# Patient Record
Sex: FEMALE | Race: WHITE | NOT HISPANIC OR LATINO
[De-identification: names, ages, dates, MRNs, and addresses within clinical notes are randomized per-mention and may not be internally consistent; named-entity substitution may affect disease eponyms.]

---

## 2020-09-29 ENCOUNTER — APPOINTMENT (OUTPATIENT)
Dept: HEPATOLOGY | Facility: CLINIC | Age: 70
End: 2020-09-29
Payer: MEDICARE

## 2020-09-29 VITALS
BODY MASS INDEX: 31.5 KG/M2 | HEIGHT: 66 IN | WEIGHT: 196 LBS | SYSTOLIC BLOOD PRESSURE: 171 MMHG | TEMPERATURE: 97.7 F | HEART RATE: 99 BPM | RESPIRATION RATE: 15 BRPM | DIASTOLIC BLOOD PRESSURE: 80 MMHG

## 2020-09-29 PROCEDURE — 99203 OFFICE O/P NEW LOW 30 MIN: CPT

## 2020-09-29 NOTE — HISTORY OF PRESENT ILLNESS
[de-identified] : 69 y/o F w/ hx of HCV Ab+ here for return appt as a new patient. Patient saw from prior lab work that she was HCV Ab+ and was unclear what it meant. Never been treated before. \par Seen previously in our liver clinic back in 2016 and was found to have no viral load.\par Asymptomatic from a liver standpoint.

## 2020-09-29 NOTE — PHYSICAL EXAM
[Scleral Icterus] : No Scleral Icterus [Spider Angioma] : No spider angioma(s) were observed [Ascites Fluid Wave] : no ascites fluid wave [Ascites Tense] : ascites is not tense [Asterixis] : no asterixis observed [Jaundice] : No jaundice [General Appearance - Alert] : alert [General Appearance - In No Acute Distress] : in no acute distress [Sclera] : the sclera and conjunctiva were normal [Auscultation Breath Sounds / Voice Sounds] : lungs were clear to auscultation bilaterally [Heart Rate And Rhythm] : heart rate was normal and rhythm regular [Heart Sounds] : normal S1 and S2 [Heart Sounds Gallop] : no gallops [Murmurs] : no murmurs [Heart Sounds Pericardial Friction Rub] : no pericardial rub [Bowel Sounds] : normal bowel sounds [Abdomen Soft] : soft [Abdomen Tenderness] : non-tender [Abdomen Mass (___ Cm)] : no abdominal mass palpated [Skin Color & Pigmentation] : normal skin color and pigmentation [Skin Turgor] : normal skin turgor [] : no rash [Oriented To Time, Place, And Person] : oriented to person, place, and time [Impaired Insight] : insight and judgment were intact [Affect] : the affect was normal

## 2020-09-29 NOTE — ASSESSMENT
[FreeTextEntry1] : 69 y/o F w/ hx of HCV Ab+ here for return appt as a new patient.\par Undetectable viral load from 2016 suggestive of prior HCV exposure and cleared infection.  had HCV that is being followed by Dr. Capone.\par Counseled patient on what a HCV Ab+ but undetectable viral load means.\par Plan to recheck blood work including HCV viral load today for reassurance.\par \par RTC prn

## 2020-09-30 LAB
ALBUMIN SERPL ELPH-MCNC: 4.4 G/DL
ALP BLD-CCNC: 84 U/L
ALT SERPL-CCNC: 13 U/L
ANION GAP SERPL CALC-SCNC: 12 MMOL/L
AST SERPL-CCNC: 10 U/L
BASOPHILS # BLD AUTO: 0.01 K/UL
BASOPHILS NFR BLD AUTO: 0.2 %
BILIRUB SERPL-MCNC: 0.6 MG/DL
BUN SERPL-MCNC: 10 MG/DL
CALCIUM SERPL-MCNC: 9.5 MG/DL
CHLORIDE SERPL-SCNC: 102 MMOL/L
CO2 SERPL-SCNC: 28 MMOL/L
CREAT SERPL-MCNC: 0.66 MG/DL
EOSINOPHIL # BLD AUTO: 0.11 K/UL
EOSINOPHIL NFR BLD AUTO: 1.9 %
GLUCOSE SERPL-MCNC: 96 MG/DL
HCT VFR BLD CALC: 42.2 %
HCV AB SER QL: NONREACTIVE
HCV S/CO RATIO: 0.31 S/CO
HEPATITIS A IGG ANTIBODY: NONREACTIVE
HGB BLD-MCNC: 13.5 G/DL
IMM GRANULOCYTES NFR BLD AUTO: 0.3 %
INR PPP: 0.96 RATIO
LYMPHOCYTES # BLD AUTO: 1.79 K/UL
LYMPHOCYTES NFR BLD AUTO: 30.7 %
MAN DIFF?: NORMAL
MCHC RBC-ENTMCNC: 28.6 PG
MCHC RBC-ENTMCNC: 32 GM/DL
MCV RBC AUTO: 89.4 FL
MONOCYTES # BLD AUTO: 0.54 K/UL
MONOCYTES NFR BLD AUTO: 9.3 %
NEUTROPHILS # BLD AUTO: 3.36 K/UL
NEUTROPHILS NFR BLD AUTO: 57.6 %
PLATELET # BLD AUTO: 222 K/UL
POTASSIUM SERPL-SCNC: 4.4 MMOL/L
PROT SERPL-MCNC: 6.6 G/DL
PT BLD: 11.5 SEC
RBC # BLD: 4.72 M/UL
RBC # FLD: 13.5 %
SODIUM SERPL-SCNC: 142 MMOL/L
WBC # FLD AUTO: 5.83 K/UL

## 2020-10-27 ENCOUNTER — NON-APPOINTMENT (OUTPATIENT)
Age: 70
End: 2020-10-27

## 2021-05-25 ENCOUNTER — NON-APPOINTMENT (OUTPATIENT)
Age: 71
End: 2021-05-25

## 2021-06-08 ENCOUNTER — APPOINTMENT (OUTPATIENT)
Dept: HEPATOLOGY | Facility: CLINIC | Age: 71
End: 2021-06-08
Payer: MEDICARE

## 2021-06-08 PROCEDURE — 90471 IMMUNIZATION ADMIN: CPT

## 2021-06-08 PROCEDURE — 90636 HEP A/HEP B VACC ADULT IM: CPT | Mod: GY

## 2021-06-21 ENCOUNTER — NON-APPOINTMENT (OUTPATIENT)
Age: 71
End: 2021-06-21

## 2021-07-13 ENCOUNTER — APPOINTMENT (OUTPATIENT)
Dept: HEPATOLOGY | Facility: CLINIC | Age: 71
End: 2021-07-13
Payer: MEDICARE

## 2021-07-13 PROCEDURE — 90636 HEP A/HEP B VACC ADULT IM: CPT | Mod: GY

## 2021-07-13 PROCEDURE — 90471 IMMUNIZATION ADMIN: CPT

## 2021-12-08 ENCOUNTER — APPOINTMENT (OUTPATIENT)
Dept: HEPATOLOGY | Facility: CLINIC | Age: 71
End: 2021-12-08
Payer: MEDICARE

## 2021-12-08 VITALS
RESPIRATION RATE: 16 BRPM | BODY MASS INDEX: 29.09 KG/M2 | WEIGHT: 181 LBS | DIASTOLIC BLOOD PRESSURE: 69 MMHG | SYSTOLIC BLOOD PRESSURE: 123 MMHG | HEIGHT: 66 IN | OXYGEN SATURATION: 95 % | TEMPERATURE: 97.2 F | HEART RATE: 86 BPM

## 2021-12-08 DIAGNOSIS — R76.8 OTHER SPECIFIED ABNORMAL IMMUNOLOGICAL FINDINGS IN SERUM: ICD-10-CM

## 2021-12-08 PROCEDURE — G0010: CPT

## 2021-12-08 PROCEDURE — 90471 IMMUNIZATION ADMIN: CPT

## 2021-12-08 PROCEDURE — 90636 HEP A/HEP B VACC ADULT IM: CPT

## 2021-12-10 ENCOUNTER — NON-APPOINTMENT (OUTPATIENT)
Age: 71
End: 2021-12-10

## 2022-10-13 ENCOUNTER — APPOINTMENT (OUTPATIENT)
Dept: PODIATRY | Facility: CLINIC | Age: 72
End: 2022-10-13
Payer: MEDICARE

## 2022-10-13 DIAGNOSIS — Z82.49 FAMILY HISTORY OF ISCHEMIC HEART DISEASE AND OTHER DISEASES OF THE CIRCULATORY SYSTEM: ICD-10-CM

## 2022-10-13 DIAGNOSIS — S90.851A SUPERFICIAL FOREIGN BODY, RIGHT FOOT, INITIAL ENCOUNTER: ICD-10-CM

## 2022-10-13 DIAGNOSIS — Z80.0 FAMILY HISTORY OF MALIGNANT NEOPLASM OF DIGESTIVE ORGANS: ICD-10-CM

## 2022-10-13 PROCEDURE — 99203 OFFICE O/P NEW LOW 30 MIN: CPT | Mod: 25

## 2022-10-13 PROCEDURE — 28192 REMOVAL OF FOOT FOREIGN BODY: CPT | Mod: RT

## 2022-10-17 ENCOUNTER — APPOINTMENT (OUTPATIENT)
Dept: PODIATRY | Facility: CLINIC | Age: 72
End: 2022-10-17

## 2022-10-17 VITALS — WEIGHT: 181 LBS | BODY MASS INDEX: 29.09 KG/M2 | HEIGHT: 66 IN

## 2022-10-17 VITALS — HEIGHT: 66 IN | WEIGHT: 181 LBS | BODY MASS INDEX: 29.09 KG/M2

## 2022-10-17 DIAGNOSIS — M79.671 PAIN IN RIGHT FOOT: ICD-10-CM

## 2022-10-17 PROBLEM — S90.851A FOREIGN BODY IN RIGHT FOOT, INITIAL ENCOUNTER: Status: ACTIVE | Noted: 2022-10-14

## 2022-10-17 PROCEDURE — 99024 POSTOP FOLLOW-UP VISIT: CPT

## 2022-10-17 NOTE — ASSESSMENT
[FreeTextEntry1] : \par Impression: Deep foreign body.\par \par Treatment: I discussed doing this in the hospital. She absolutely refused. She wanted me to try to do it in the office so I took her to the operating room. I went through detailed consent. I discussed the possibility of continued retained foreign body, infection, and incisional problems, painful scar, wound problems and she wants to proceed. \par The foot was prepped with alcohol and Betadine. I used an ankle pneumatic tourniquet 250 mmHg after injecting Xylocaine 3cc's. Under strict sterile technique I used a #15 blade. I ellipsed the portal of entry and the patient had a bit of an inclusion cyst and a miniscule foreign body. It is difficult to tell where it was plastic. It could potentially be hair. I removed the surrounding cyst and soft tissue with it. I irrigated it with sterile saline under high force and I used 4-0 nylon to repair the wound. I put a sterile surgical dressing on. I put her into a surgical shoe. She is going to rest and keep the foot elevated. She will take Tylenol. I am going to see her back in 4 days. She will call the office with any issues whatsoever.

## 2022-10-17 NOTE — HISTORY OF PRESENT ILLNESS
[Sneakers] : grayson [FreeTextEntry1] : Patient presents today with this painful foreign body, possible a piece of hair that has been going on for a month. It is not getting any better and her pain level is 4/10. She presents today for evaluation and treatment.  Patient was referred by Dr Ten Ashraf. She presents today with 2 reports. One demonstrating suspicion for embedded hair shaft as well as an ultrasound that shows a retained foreign body deep to the skin surface, 2.4 x 1mm hyperechoic, noted foreign body with foreign body response.  She has recently gone for vascular evaluation who felt her circulation was adequate.\par

## 2022-10-17 NOTE — PHYSICAL EXAM
[2+] : left foot posterior tibialis 2+ [1+] : left foot dorsalis pedis 1+ [Sensation] : the sensory exam was normal to light touch and pinprick [No Focal Deficits] : no focal deficits [Deep Tendon Reflexes (DTR)] : deep tendon reflexes were 2+ and symmetric [Motor Exam] : the motor exam was normal [Delayed in the Right Toes] : capillary refills normal in right toes [Delayed in the Left Toes] : capillary refills normal in the left toes [FreeTextEntry3] : S [FreeTextEntry1] : There is a portal of entry at the right foot sub 4 and 5 area that is open and inflamed. There is no erythema, drainage or infection appreciated.

## 2022-10-18 PROBLEM — M79.671 RIGHT FOOT PAIN: Status: ACTIVE | Noted: 2022-10-14

## 2022-10-19 NOTE — PHYSICAL EXAM
[2+] : left foot posterior tibialis 2+ [1+] : left foot dorsalis pedis 1+ [Sensation] : the sensory exam was normal to light touch and pinprick [No Focal Deficits] : no focal deficits [Deep Tendon Reflexes (DTR)] : deep tendon reflexes were 2+ and symmetric [Motor Exam] : the motor exam was normal [Delayed in the Right Toes] : capillary refills normal in right toes [Delayed in the Left Toes] : capillary refills normal in the left toes [FreeTextEntry3] : CFT: Normal. [de-identified] : Doing well S/P removal of foreign body. [FreeTextEntry1] : Incision is well coapted. No swelling, erythema or pain.

## 2022-10-19 NOTE — ASSESSMENT
[FreeTextEntry1] : \par Treatment: I put a sterile surgical dressing on. Continue mild activity. Follow-up this week for suture removal.

## 2022-10-19 NOTE — HISTORY OF PRESENT ILLNESS
[Post-op Sandals] : post-op sandals [FreeTextEntry1] : Patient presents today S/P removal of foreign body. She has really at this point no complaints. She is doing well. \par \par

## 2022-10-21 ENCOUNTER — APPOINTMENT (OUTPATIENT)
Dept: PODIATRY | Facility: CLINIC | Age: 72
End: 2022-10-21
Payer: MEDICARE

## 2022-10-21 VITALS — BODY MASS INDEX: 29.09 KG/M2 | WEIGHT: 181 LBS | HEIGHT: 66 IN

## 2022-10-21 DIAGNOSIS — Z48.89 ENCOUNTER FOR OTHER SPECIFIED SURGICAL AFTERCARE: ICD-10-CM

## 2022-10-21 DIAGNOSIS — S90.851A SUPERFICIAL FOREIGN BODY, RIGHT FOOT, INITIAL ENCOUNTER: ICD-10-CM

## 2022-10-21 PROCEDURE — 99024 POSTOP FOLLOW-UP VISIT: CPT | Mod: 24

## 2022-10-21 PROCEDURE — 99212 OFFICE O/P EST SF 10 MIN: CPT | Mod: 24

## 2022-10-24 PROBLEM — S90.851A SUPERFICIAL FOREIGN BODY OF RIGHT FOOT, INITIAL ENCOUNTER: Status: ACTIVE | Noted: 2022-10-17

## 2022-10-24 PROBLEM — Z48.89 ENCOUNTER FOR SURGICAL FOLLOW-UP CARE: Status: ACTIVE | Noted: 2022-10-18

## 2022-10-25 NOTE — HISTORY OF PRESENT ILLNESS
[Sneakers] : grayson [FreeTextEntry1] : Patient presents today S/P removal of foreign body/foreign body reaction on the plantar aspect of the right foot. \par \par \par

## 2022-10-25 NOTE — ASSESSMENT
[FreeTextEntry1] : \par Treatment: Sutures were removed. Steri-strips applied. Pathology is still pending.

## 2022-10-25 NOTE — PHYSICAL EXAM
[2+] : left foot posterior tibialis 2+ [1+] : left foot dorsalis pedis 1+ [Sensation] : the sensory exam was normal to light touch and pinprick [No Focal Deficits] : no focal deficits [Deep Tendon Reflexes (DTR)] : deep tendon reflexes were 2+ and symmetric [Motor Exam] : the motor exam was normal [Delayed in the Right Toes] : capillary refills normal in right toes [Delayed in the Left Toes] : capillary refills normal in the left toes [FreeTextEntry3] : CFT: Normal. [de-identified] : No pain. No infection.  [FreeTextEntry1] : Very well coapted incision. No swelling. No bruising. No erythema. It is very nicely healed.

## 2022-11-04 LAB — CORE LAB BIOPSY: NORMAL

## 2023-01-19 ENCOUNTER — APPOINTMENT (OUTPATIENT)
Dept: PODIATRY | Facility: CLINIC | Age: 73
End: 2023-01-19

## 2023-03-10 ENCOUNTER — INPATIENT (INPATIENT)
Facility: HOSPITAL | Age: 73
LOS: 24 days | Discharge: HOME | End: 2023-04-04
Attending: PSYCHIATRY & NEUROLOGY | Admitting: PSYCHIATRY & NEUROLOGY
Payer: MEDICARE

## 2023-03-10 VITALS — TEMPERATURE: 97 F

## 2023-03-10 DIAGNOSIS — F32.9 MAJOR DEPRESSIVE DISORDER, SINGLE EPISODE, UNSPECIFIED: ICD-10-CM

## 2023-03-10 LAB
ALBUMIN SERPL ELPH-MCNC: 4.1 G/DL — SIGNIFICANT CHANGE UP (ref 3.3–5)
ALP SERPL-CCNC: 63 U/L — SIGNIFICANT CHANGE UP (ref 40–120)
ALT FLD-CCNC: 9 U/L — SIGNIFICANT CHANGE UP (ref 4–33)
ANION GAP SERPL CALC-SCNC: 12 MMOL/L — SIGNIFICANT CHANGE UP (ref 7–14)
APAP SERPL-MCNC: <10 UG/ML — LOW (ref 15–25)
APPEARANCE UR: CLEAR — SIGNIFICANT CHANGE UP
AST SERPL-CCNC: 12 U/L — SIGNIFICANT CHANGE UP (ref 4–32)
B PERT DNA SPEC QL NAA+PROBE: SIGNIFICANT CHANGE UP
B PERT+PARAPERT DNA PNL SPEC NAA+PROBE: SIGNIFICANT CHANGE UP
BACTERIA # UR AUTO: NEGATIVE — SIGNIFICANT CHANGE UP
BASOPHILS # BLD AUTO: 0.03 K/UL — SIGNIFICANT CHANGE UP (ref 0–0.2)
BASOPHILS NFR BLD AUTO: 0.4 % — SIGNIFICANT CHANGE UP (ref 0–2)
BILIRUB SERPL-MCNC: 0.7 MG/DL — SIGNIFICANT CHANGE UP (ref 0.2–1.2)
BILIRUB UR-MCNC: NEGATIVE — SIGNIFICANT CHANGE UP
BORDETELLA PARAPERTUSSIS (RAPRVP): SIGNIFICANT CHANGE UP
BUN SERPL-MCNC: 8 MG/DL — SIGNIFICANT CHANGE UP (ref 7–23)
C PNEUM DNA SPEC QL NAA+PROBE: SIGNIFICANT CHANGE UP
CALCIUM SERPL-MCNC: 9 MG/DL — SIGNIFICANT CHANGE UP (ref 8.4–10.5)
CHLORIDE SERPL-SCNC: 98 MMOL/L — SIGNIFICANT CHANGE UP (ref 98–107)
CO2 SERPL-SCNC: 24 MMOL/L — SIGNIFICANT CHANGE UP (ref 22–31)
COLOR SPEC: YELLOW — SIGNIFICANT CHANGE UP
CREAT SERPL-MCNC: 0.5 MG/DL — SIGNIFICANT CHANGE UP (ref 0.5–1.3)
DIFF PNL FLD: NEGATIVE — SIGNIFICANT CHANGE UP
EGFR: 99 ML/MIN/1.73M2 — SIGNIFICANT CHANGE UP
EOSINOPHIL # BLD AUTO: 0.06 K/UL — SIGNIFICANT CHANGE UP (ref 0–0.5)
EOSINOPHIL NFR BLD AUTO: 0.8 % — SIGNIFICANT CHANGE UP (ref 0–6)
ETHANOL SERPL-MCNC: <10 MG/DL — SIGNIFICANT CHANGE UP
FLUAV SUBTYP SPEC NAA+PROBE: SIGNIFICANT CHANGE UP
FLUBV RNA SPEC QL NAA+PROBE: SIGNIFICANT CHANGE UP
GLUCOSE SERPL-MCNC: 91 MG/DL — SIGNIFICANT CHANGE UP (ref 70–99)
GLUCOSE UR QL: NEGATIVE — SIGNIFICANT CHANGE UP
HADV DNA SPEC QL NAA+PROBE: SIGNIFICANT CHANGE UP
HCOV 229E RNA SPEC QL NAA+PROBE: SIGNIFICANT CHANGE UP
HCOV HKU1 RNA SPEC QL NAA+PROBE: SIGNIFICANT CHANGE UP
HCOV NL63 RNA SPEC QL NAA+PROBE: SIGNIFICANT CHANGE UP
HCOV OC43 RNA SPEC QL NAA+PROBE: SIGNIFICANT CHANGE UP
HCT VFR BLD CALC: 41.9 % — SIGNIFICANT CHANGE UP (ref 34.5–45)
HGB BLD-MCNC: 13.5 G/DL — SIGNIFICANT CHANGE UP (ref 11.5–15.5)
HMPV RNA SPEC QL NAA+PROBE: SIGNIFICANT CHANGE UP
HPIV1 RNA SPEC QL NAA+PROBE: SIGNIFICANT CHANGE UP
HPIV2 RNA SPEC QL NAA+PROBE: SIGNIFICANT CHANGE UP
HPIV3 RNA SPEC QL NAA+PROBE: SIGNIFICANT CHANGE UP
HPIV4 RNA SPEC QL NAA+PROBE: SIGNIFICANT CHANGE UP
IANC: 5.04 K/UL — SIGNIFICANT CHANGE UP (ref 1.8–7.4)
IMM GRANULOCYTES NFR BLD AUTO: 0.4 % — SIGNIFICANT CHANGE UP (ref 0–0.9)
KETONES UR-MCNC: ABNORMAL
LEUKOCYTE ESTERASE UR-ACNC: NEGATIVE — SIGNIFICANT CHANGE UP
LYMPHOCYTES # BLD AUTO: 1.96 K/UL — SIGNIFICANT CHANGE UP (ref 1–3.3)
LYMPHOCYTES # BLD AUTO: 25 % — SIGNIFICANT CHANGE UP (ref 13–44)
M PNEUMO DNA SPEC QL NAA+PROBE: SIGNIFICANT CHANGE UP
MCHC RBC-ENTMCNC: 27.1 PG — SIGNIFICANT CHANGE UP (ref 27–34)
MCHC RBC-ENTMCNC: 32.2 GM/DL — SIGNIFICANT CHANGE UP (ref 32–36)
MCV RBC AUTO: 84 FL — SIGNIFICANT CHANGE UP (ref 80–100)
MONOCYTES # BLD AUTO: 0.72 K/UL — SIGNIFICANT CHANGE UP (ref 0–0.9)
MONOCYTES NFR BLD AUTO: 9.2 % — SIGNIFICANT CHANGE UP (ref 2–14)
NEUTROPHILS # BLD AUTO: 5.04 K/UL — SIGNIFICANT CHANGE UP (ref 1.8–7.4)
NEUTROPHILS NFR BLD AUTO: 64.2 % — SIGNIFICANT CHANGE UP (ref 43–77)
NITRITE UR-MCNC: NEGATIVE — SIGNIFICANT CHANGE UP
NRBC # BLD: 0 /100 WBCS — SIGNIFICANT CHANGE UP (ref 0–0)
NRBC # FLD: 0 K/UL — SIGNIFICANT CHANGE UP (ref 0–0)
PCP SPEC-MCNC: SIGNIFICANT CHANGE UP
PH UR: 6 — SIGNIFICANT CHANGE UP (ref 5–8)
PLATELET # BLD AUTO: 228 K/UL — SIGNIFICANT CHANGE UP (ref 150–400)
POTASSIUM SERPL-MCNC: 3.9 MMOL/L — SIGNIFICANT CHANGE UP (ref 3.5–5.3)
POTASSIUM SERPL-SCNC: 3.9 MMOL/L — SIGNIFICANT CHANGE UP (ref 3.5–5.3)
PROT SERPL-MCNC: 6.8 G/DL — SIGNIFICANT CHANGE UP (ref 6–8.3)
PROT UR-MCNC: NEGATIVE — SIGNIFICANT CHANGE UP
RAPID RVP RESULT: SIGNIFICANT CHANGE UP
RBC # BLD: 4.99 M/UL — SIGNIFICANT CHANGE UP (ref 3.8–5.2)
RBC # FLD: 13.2 % — SIGNIFICANT CHANGE UP (ref 10.3–14.5)
RBC CASTS # UR COMP ASSIST: SIGNIFICANT CHANGE UP /HPF (ref 0–4)
RSV RNA SPEC QL NAA+PROBE: SIGNIFICANT CHANGE UP
RV+EV RNA SPEC QL NAA+PROBE: SIGNIFICANT CHANGE UP
SALICYLATES SERPL-MCNC: <0.3 MG/DL — LOW (ref 15–30)
SARS-COV-2 RNA SPEC QL NAA+PROBE: SIGNIFICANT CHANGE UP
SODIUM SERPL-SCNC: 134 MMOL/L — LOW (ref 135–145)
SP GR SPEC: 1.01 — SIGNIFICANT CHANGE UP (ref 1.01–1.05)
TSH SERPL-MCNC: 2.51 UIU/ML — SIGNIFICANT CHANGE UP (ref 0.27–4.2)
UROBILINOGEN FLD QL: SIGNIFICANT CHANGE UP
WBC # BLD: 7.84 K/UL — SIGNIFICANT CHANGE UP (ref 3.8–10.5)
WBC # FLD AUTO: 7.84 K/UL — SIGNIFICANT CHANGE UP (ref 3.8–10.5)
WBC UR QL: SIGNIFICANT CHANGE UP /HPF (ref 0–5)

## 2023-03-10 PROCEDURE — 70450 CT HEAD/BRAIN W/O DYE: CPT | Mod: 26,MA

## 2023-03-10 PROCEDURE — 90792 PSYCH DIAG EVAL W/MED SRVCS: CPT

## 2023-03-10 PROCEDURE — 99285 EMERGENCY DEPT VISIT HI MDM: CPT

## 2023-03-10 RX ORDER — LEVOTHYROXINE SODIUM 125 MCG
50 TABLET ORAL DAILY
Refills: 0 | Status: DISCONTINUED | OUTPATIENT
Start: 2023-03-10 | End: 2023-04-04

## 2023-03-10 RX ORDER — OFLOXACIN 0.3 %
1 DROPS OPHTHALMIC (EYE)
Refills: 0 | Status: DISCONTINUED | OUTPATIENT
Start: 2023-03-10 | End: 2023-03-11

## 2023-03-10 RX ORDER — SERTRALINE 25 MG/1
25 TABLET, FILM COATED ORAL DAILY
Refills: 0 | Status: DISCONTINUED | OUTPATIENT
Start: 2023-03-10 | End: 2023-03-11

## 2023-03-10 RX ORDER — LEVOTHYROXINE SODIUM 125 MCG
1 TABLET ORAL
Qty: 0 | Refills: 0 | DISCHARGE

## 2023-03-10 NOTE — ED BEHAVIORAL HEALTH ASSESSMENT NOTE - OTHER PAST PSYCHIATRIC HISTORY (INCLUDE DETAILS REGARDING ONSET, COURSE OF ILLNESS, INPATIENT/OUTPATIENT TREATMENT)
PPH MDD. Hx of 1 past inpatient admission 10-15 years ago (hospital unknown). Hx of 1 past suicide attempt 10-15 years ago via cutting- patient reports she required stitches. No current outpatient treater. [facility] MDD. Hx of 1 past inpatient admission 10-15 years ago (hospital unknown). Hx of 1 past suicide attempt 10-15 years ago via cutting- patient reports she required stitches. No current outpatient treater.    No history of bipolar disorder symptoms or psychosis

## 2023-03-10 NOTE — ED PROVIDER NOTE - CLINICAL SUMMARY MEDICAL DECISION MAKING FREE TEXT BOX
This is a 72-year-old female with a past medical history of depression that is been going on for the last several months.  She states that she for the past several months has stopped taking her medication which was Abilify. Depression-labs, mila consult, will admit for depression and SI. Will also get CT head.

## 2023-03-10 NOTE — ED PROVIDER NOTE - OBJECTIVE STATEMENT
This is a 72-year-old female with a past medical history of depression that is been going on for the last several months.  She states that she for the past several months has stopped taking her medication which was Abilify.  About a week ago she started taking it again because the depression got severe and the depression is worsening.  She has not seen her psychiatrist in several months and she is attempting to get a new psychiatrist this morning however was unable to obtain.  She states that last night she tried to cut her wrist on her left hands.  An attempt to commit suicide.  She has had suicidal attempt several years ago which required stitches.  Patient has been going through a lot with her family including diagnosis of cancer to her .  She denies having any chest pain shortness of breath trouble breathing fever chills or body aches. no drug or alcohol usage. This is a 72-year-old female with a past medical history of TSH, depression that is been going on for the last several months.  She states that she for the past several months has stopped taking her medication which was Abilify.  About a week ago she started taking it again because the depression got severe and the depression is worsening.  She has not seen her psychiatrist in several months and she is attempting to get a new psychiatrist this morning however was unable to obtain.  She states that last night she tried to cut her wrist on her left hands.  An attempt to commit suicide.  She has had suicidal attempt several years ago which required stitches.  Patient has been going through a lot with her family including diagnosis of cancer to her .  She denies having any chest pain shortness of breath trouble breathing fever chills or body aches. no drug or alcohol usage.

## 2023-03-10 NOTE — ED BEHAVIORAL HEALTH ASSESSMENT NOTE - NSBHATTESTCOMMENTATTENDFT_PSY_A_CORE
Patient is a 72 year old  retired  female currently residing in a private residence with  (daughter/son in law and 3 grandchildren live upstairs in a mother/daughter home). [facility] MDD. Hx of 1 past inpatient admission 10-15 years ago (hospital unknown). Hx of 1 past suicide attempt 10-15 years ago via cutting- patient reports she required stitches. No history of aggression/violence, legal issues or substance use issues. PMH- Hypothyroidism. BIB  for depression.    Patient presents to the ED in the context of depression. Patient endorses stopping psychiatric medication and over the last 2 weeks has had onset of depressive symptoms. She has multiple psychosocial stressors- various family members are ill. Patient had been having passive suicidal ideation but yesterday engaged in SIB- cutting self with scissors with ambivalent suicidal intent. She continues to endorse depression, anhedonia and  is not at baseline. She is unpredictable and impulsive and is requesting and agreeing to voluntary inpatient admission.

## 2023-03-10 NOTE — ED BEHAVIORAL HEALTH ASSESSMENT NOTE - DIFFERENTIAL
MDD   Dementia vs. Pseudo dementia symptoms related to MDD  Delusional Disorder? - chronic thoughts  is cheating on her MDD   Cognitive impairment vs. Pseudo dementia symptoms related to MDD  Delusional Disorder? - chronic thoughts  is cheating on her

## 2023-03-10 NOTE — ED BEHAVIORAL HEALTH ASSESSMENT NOTE - SUMMARY
Patient is a 72 year old  retired  female currently residing in a private residence with  (daughter/son in law and 3 grandchildren live upstairs in a mother/daughter home). PPH MDD. Hx of 1 past inpatient admission 10-15 years ago (hospital unknown). Hx of 1 past suicide attempt 10-15 years ago via cutting- patient reports she required stitches. No history of aggression/violence, legal issues or substance use issues. PMH- Hypothyroidism. BIB  for depression.    Patient presents to the ED in the context of depression. Patient endorses stopping psychiatric medication and over the last 2 weeks has had onset of depressive symptoms. She has multiple psychosocial stressors- various family members are ill. Patient had been having passive suicidal ideation but yesterday engaged in SIB- cutting self with scissors with ambivalent intent. She continues to endorse depression, anhedonia and  is not at baseline. She is unpredictable and impulsive and is requesting and agreeing to voluntary inpatient admission. Patient is a 72 year old  retired  female currently residing in a private residence with  (daughter/son in law and 3 grandchildren live upstairs in a mother/daughter home). PPH MDD. Hx of 1 past inpatient admission 10-15 years ago (hospital unknown). Hx of 1 past suicide attempt 10-15 years ago via cutting- patient reports she required stitches. No history of aggression/violence, legal issues or substance use issues. PMH- Hypothyroidism. BIB  for depression.    Patient presents to the ED in the context of depression. Patient endorses stopping psychiatric medication and over the last 2 weeks has had onset of depressive symptoms. She has multiple psychosocial stressors- various family members are ill. Patient had been having passive suicidal ideation but yesterday engaged in SIB- cutting self with scissors with ambivalent suicidal intent. She continues to endorse depression, anhedonia and  is not at baseline. She is unpredictable and impulsive and is requesting and agreeing to voluntary inpatient admission.
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
## 2023-03-10 NOTE — ED BEHAVIORAL HEALTH ASSESSMENT NOTE - CURRENT MEDICATION
Abilify 10mg , Synthroid Abilify 10mg , Synthroid 50mcg daily     Ventolin inhaler PRN Abilify 10mg- taking x 2 days , Synthroid 50mcg daily     Ventolin inhaler PRN

## 2023-03-10 NOTE — ED BEHAVIORAL HEALTH NOTE - BEHAVIORAL HEALTH NOTE
[employer] contacted [employer] ([phone]) to obtain authorization for inpatient psychiatric admission. Spoke with Carl BARR who provided authorization #[id number] which is valid 3/10/23-3/13/23 with review due 3/13/23. [employer] contacted [employer] ([phone]) to obtain authorization for inpatient psychiatric admission. Spoke with Carl BARR who provided authorization #[id number] which is valid 3/10/23-3/13/23 with review due 3/13/23. UR contact at [facility]. 30.3

## 2023-03-10 NOTE — ED BEHAVIORAL HEALTH ASSESSMENT NOTE - RISK ASSESSMENT
modifiable risk factors- depression, suicidal behavior, unpredictable bx, anhedonia, multiple psychosocial stressors    unmodifiable risk factors- history of inpatient admission, history of suicide attempt, r/o trauma?    protective factors- no substance use, no nury/hypomania, no psychosis

## 2023-03-10 NOTE — ED BEHAVIORAL HEALTH ASSESSMENT NOTE - MEDICAL ISSUES AND PLAN (INCLUDE STANDING AND PRN MEDICATION)
Ofloxacin eye drops QID x 7 days, Synthroid 50mcg daily Ofloxacin eye drops QID x 7 days (pink eye), Synthroid 50mcg daily

## 2023-03-10 NOTE — ED ADULT NURSE REASSESSMENT NOTE - NS ED NURSE REASSESS COMMENT FT1
Pt received at 8:30pm shift change. Pt presently calm and cooperative, watching TV. Pt awaiting CT Results and then xfer to inpatient psychiatric bed on LOW 5.

## 2023-03-10 NOTE — ED ADULT TRIAGE NOTE - CHIEF COMPLAINT QUOTE
Pt c/o worsening depression. Reports she discontinued her medication for about 3 1/2 months and was restarted on med about one week ago. Denies SI/HI, auditory/visual hallucinations. denies illicit drug/alcohol use. Hx: depression

## 2023-03-10 NOTE — ED BEHAVIORAL HEALTH ASSESSMENT NOTE - DESCRIPTION
72 year old female, retired , lives with  hypothyroidism During course of ED visit patient was calm and cooperative. Patient was not aggressive or violent and did not require PRN medications.    ICU Vital Signs Last 24 Hrs  T(C): 36.6 (10 Mar 2023 14:35), Max: 36.6 (10 Mar 2023 14:35)  T(F): 97.9 (10 Mar 2023 14:35), Max: 97.9 (10 Mar 2023 14:35)  HR: 92 (10 Mar 2023 14:35) (92 - 92)  BP: 147/72 (10 Mar 2023 14:35) (147/72 - 147/72)  BP(mean): --  ABP: --  ABP(mean): --  RR: 16 (10 Mar 2023 14:35) (16 - 16)  SpO2: 99% (10 Mar 2023 14:35) (99% - 99%)    O2 Parameters below as of 10 Mar 2023 14:35  Patient On (Oxygen Delivery Method): room air

## 2023-03-10 NOTE — ED BEHAVIORAL HEALTH ASSESSMENT NOTE - PSYCHIATRIC ISSUES AND PLAN (INCLUDE STANDING AND PRN MEDICATION)
Unclear why patient prescribed Monotherapy- Abilify 10mg; Would recommend starting SSRI ; Unclear why patient prescribed Monotherapy- Abilify 10mg; Would recommend starting SSRI Lexapro 10mg; PRN Ativan 0.5mg PRN, Ativan 1mg IM PRN Unclear why patient prescribed Monotherapy- Abilify 10mg; Would recommend starting SSRI Zoloft 25mg; PRN Ativan 0.25mg PRN, Ativan 1mg IM PRN Unclear why patient prescribed Monotherapy- Abilify 10mg- will stop only taking x 2 days; Would recommend starting SSRI Zoloft 25mg; PRN Ativan 0.25mg PRN, Ativan 1mg IM PRN

## 2023-03-10 NOTE — ED BEHAVIORAL HEALTH NOTE - BEHAVIORAL HEALTH NOTE
COVID Exposure Screen- Patient    1.	*Have you had a COVID-19 test in the last 90 days?  (  ) Yes   (x  ) No   (  ) Unknown- Reason: _____  IF YES PROCEED TO QUESTION #2. IF NO OR UNKNOWN, PLEASE SKIP TO QUESTION #3.  2.	Date of test(s) and result(s): ________    3.	*Have you tested positive for COVID-19 antibodies? (  ) Yes   ( x ) No   (  ) Unknown- Reason: _____  IF YES PROCEED TO QUESTION #4. IF NO or UNKNOWN, PLEASE SKIP TO QUESTION #5.  4.	Date of positive antibody test: ________    5.	*Have you received 2 doses of the COVID-19 vaccine? ( x ) Yes   (  ) No   (  ) Unknown- Reason: _____   IF YES PROCEED TO QUESTION #6. IF NO or UNKNOWN, PLEASE SKIP TO QUESTION #7.  6.	Date of second dose:2022    7.	*In the past 10 days, have you been around anyone with a positive COVID-19 test?* (  ) Yes   (  x) No   (  ) Unknown- Reason: ____  IF YES PROCEED TO QUESTION #8. IF NO or UNKNOWN, PLEASE SKIP TO QUESTION #13.  8.	Were you within 6 feet of them for at least 15 minutes? (  ) Yes   (  ) No   (  ) Unknown- Reason: _____  9.	Have you provided care for them? (  ) Yes   (  ) No   (  ) Unknown- Reason: ______  10.	Have you had direct physical contact with them (touched, hugged, or kissed them)? (  ) Yes   (  ) No    (  ) Unknown- Reason: _____  11.	Have you shared eating or drinking utensils with them? (  ) Yes   (  ) No    (  ) Unknown- Reason: ____  12.	Have they sneezed, coughed, or somehow gotten respiratory droplets on you? (  ) Yes   (  ) No    (  ) Unknown- Reason: ______    13.	*Have you been out of [place] within the past 10 days?* (  ) Yes   ( x ) No   (  ) Unknown- Reason: _____  IF YES PLEASE ANSWER THE FOLLOWING QUESTIONS:  14.	Which state/country have you been to? ______  15.	Were you there over 24 hours? (  ) Yes   (  ) No    (  ) Unknown- Reason: ______  16.	Date of return to [facility]: ______

## 2023-03-10 NOTE — ED BEHAVIORAL HEALTH ASSESSMENT NOTE - NSPRESENTSXS_PSY_ALL_CORE
Depressed mood/Anhedonia/Hopelessness or despair Depressed mood/Anhedonia/Impulsivity/Hopelessness or despair
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
## 2023-03-10 NOTE — ED ADULT NURSE NOTE - OBJECTIVE STATEMENT
Pt c/o worsening depression. Reports she discontinued her medication for about 3 1/2 months and was restarted on med about one week ago. Denies SI/HI, auditory/visual hallucinations. denies illicit drug/alcohol use.   Patient to  area and evaluated. . Pt has been calm and cooperative since arrival. Pt is waiting for further orders, evaluations and disposition.  RICARDO Monaco
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
## 2023-03-11 LAB
COVID-19 SPIKE DOMAIN AB INTERP: POSITIVE
COVID-19 SPIKE DOMAIN AB INTERP: POSITIVE
COVID-19 SPIKE DOMAIN ANTIBODY RESULT: >250 U/ML — HIGH
COVID-19 SPIKE DOMAIN ANTIBODY RESULT: >250 U/ML — HIGH
GLUCOSE BLDC GLUCOMTR-MCNC: 101 MG/DL — HIGH (ref 70–99)
SARS-COV-2 IGG+IGM SERPL QL IA: >250 U/ML — HIGH
SARS-COV-2 IGG+IGM SERPL QL IA: >250 U/ML — HIGH
SARS-COV-2 IGG+IGM SERPL QL IA: POSITIVE
SARS-COV-2 IGG+IGM SERPL QL IA: POSITIVE

## 2023-03-11 PROCEDURE — 99222 1ST HOSP IP/OBS MODERATE 55: CPT

## 2023-03-11 PROCEDURE — 93010 ELECTROCARDIOGRAM REPORT: CPT

## 2023-03-11 PROCEDURE — 99223 1ST HOSP IP/OBS HIGH 75: CPT

## 2023-03-11 RX ORDER — SERTRALINE 25 MG/1
25 TABLET, FILM COATED ORAL ONCE
Refills: 0 | Status: COMPLETED | OUTPATIENT
Start: 2023-03-11 | End: 2023-03-11

## 2023-03-11 RX ORDER — QUETIAPINE FUMARATE 200 MG/1
12.5 TABLET, FILM COATED ORAL EVERY 6 HOURS
Refills: 0 | Status: DISCONTINUED | OUTPATIENT
Start: 2023-03-11 | End: 2023-03-11

## 2023-03-11 RX ORDER — ARIPIPRAZOLE 15 MG/1
5 TABLET ORAL DAILY
Refills: 0 | Status: DISCONTINUED | OUTPATIENT
Start: 2023-03-11 | End: 2023-03-12

## 2023-03-11 RX ORDER — KETOTIFEN FUMARATE 0.34 MG/ML
1 SOLUTION OPHTHALMIC
Refills: 0 | Status: DISCONTINUED | OUTPATIENT
Start: 2023-03-11 | End: 2023-04-04

## 2023-03-11 RX ORDER — SERTRALINE 25 MG/1
25 TABLET, FILM COATED ORAL DAILY
Refills: 0 | Status: DISCONTINUED | OUTPATIENT
Start: 2023-03-11 | End: 2023-03-14

## 2023-03-11 RX ORDER — HALOPERIDOL DECANOATE 100 MG/ML
2 INJECTION INTRAMUSCULAR EVERY 8 HOURS
Refills: 0 | Status: DISCONTINUED | OUTPATIENT
Start: 2023-03-11 | End: 2023-03-12

## 2023-03-11 RX ORDER — QUETIAPINE FUMARATE 200 MG/1
25 TABLET, FILM COATED ORAL EVERY 6 HOURS
Refills: 0 | Status: DISCONTINUED | OUTPATIENT
Start: 2023-03-11 | End: 2023-03-11

## 2023-03-11 RX ADMIN — Medication 1 DROP(S): at 09:36

## 2023-03-11 RX ADMIN — Medication 50 MICROGRAM(S): at 05:42

## 2023-03-11 RX ADMIN — ARIPIPRAZOLE 5 MILLIGRAM(S): 15 TABLET ORAL at 10:39

## 2023-03-11 RX ADMIN — QUETIAPINE FUMARATE 25 MILLIGRAM(S): 200 TABLET, FILM COATED ORAL at 14:59

## 2023-03-11 RX ADMIN — SERTRALINE 25 MILLIGRAM(S): 25 TABLET, FILM COATED ORAL at 15:08

## 2023-03-11 NOTE — BH PATIENT PROFILE - FUNCTIONAL ASSESSMENT - BASIC MOBILITY 2.
4 = No assist / stand by assistance  , now 6 wks preg. blood on wiping tissue.   no abd pain, + mild cramping,   exam unremarkable, stable.  a/p: labs, imaging, reassess

## 2023-03-11 NOTE — BH INPATIENT PSYCHIATRY ASSESSMENT NOTE - NSBHASSESSSUMMFT_PSY_ALL_CORE
Patient with hx depression suicide attempt, psych hospitalization in past. was on Abilify until 5 months ago stopped due to her concern about long term use. In setting of chronical jealous delusions and psychosocial stressors patient became increasingly dysphoric, anhedonic, withdrawn, culminating in impulsive wrist cutting with ambivalent intent to end life. Patient currently dysphoric, anhedonic but in control, well related, denies current Si and is not assessed as needing CO. Will  restart Abilify given deterioration sicnce d/c and presence of delusion and add Zoloft. Seen by medicine assessed as not having pink eye. Will get EKG  for baseline. Get more collateral from family and past providers.
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
## 2023-03-11 NOTE — BH INPATIENT PSYCHIATRY ASSESSMENT NOTE - NSCOMMENTSUICRISKFACT_PSY_ALL_CORE
Patient at low current risk of harm as she is denying Si, expressing hopes she will get better, cooperative with treatment, strong support sysptem.

## 2023-03-11 NOTE — BH INPATIENT PSYCHIATRY ASSESSMENT NOTE - RISK ASSESSMENT
modifiable risk factors- depression, suicidal behavior, unpredictable bx, anhedonia, multiple psychosocial stressors    unmodifiable risk factors- history of inpatient admission, history of suicide attempt, r/o trauma?    protective factors- no substance use, no nury/hypomania, no psychosis modifiable risk factors- depression, suicidal behavior, unpredictable bx, anhedonia, multiple psychosocial stressors    unmodifiable risk factors- history of inpatient admission, history of suicide attempt, r/o trauma?    protective factors- no substance use, no nury/hypomania, no psychosis, supportive family

## 2023-03-11 NOTE — BH CHART NOTE - NSEVENTNOTEFT_PSY_ALL_CORE
AMARA called for MEDICAL RAPID RESPONSE as patient was noted by RN to be unresponsive even with painful stimuli and a low SaO2. Upon evaluation, patient was sitting calmly in her chair with 2L NC for oxygen. Pt denies chest pain, SOB, or edema. Denies headache, visual changes, confusion, or n/v. Overall, she states that she feels at her baseline. Repeat vitals including SaO2 were WNL both on 2L O2 via NC and via RA. Of note, patient found to have had one episode of urinary incontinence, but she did not remember what had transpired. She denied dysuria sx. She also denies any hx of seizure hx. No accounts of seizure-like activity witnessed by staff. Patient did note that she felt sedated and that the "Psych medications maybe were too much."    T(C): 36.3 (03-11-23 @ 16:09), Max: 36.8 (03-10-23 @ 20:37)  HR: 86 (03-11-23 @ 17:17) (86 - 88)  BP: 135/70 (03-10-23 @ 20:37) (135/70 - 135/70)  RR: 18 (03-11-23 @ 05:26) (16 - 18)  SpO2: 96% (03-11-23 @ 17:17) (82% - 99%)    Physical Exam:  Gen: Patient sitting on chair, NAD   HEENT: NC/AT,  EOMI.    Resp: CTA b/l. No wheezes, ronchi, or crackles.   CV: Radial pulses 2+ b/l, RRR, no murmurs.   Abd: soft, NTND, no guarding or rigidity. NABS.    Ext: ROM intact, no clubbing, edema, or cyanosis.   Neuro: awake, alert, grossly oriented.     Assessment:  AMARA called Medical Rapid Response. Pt clinically stable with exam otherwise unremarkable. Will decrease Seroquel dose due to excess sedation, fall risk.    Plan:  1. Will decrease Seroquel to 12.5mg PRN q6 from 25mg. No further medical intervention necessary at this time.   2. will continue to monitor routinely.   3. d/w RN staff  AMARA called for MEDICAL RAPID RESPONSE as patient was noted by RN to be unresponsive even with painful stimuli and a low SaO2. Upon evaluation, patient was sitting calmly in her chair with 2L NC for oxygen. Pt denies chest pain, SOB, or edema. Denies headache, visual changes, confusion, or n/v. Overall, she states that she feels at her baseline. Repeat vitals including SaO2 were WNL both on 2L O2 via NC and via RA. Of note, patient found to have had one episode of urinary incontinence, but she did not remember what had transpired. She denied dysuria sx. She also denies any hx of seizure hx. No accounts of seizure-like activity witnessed by staff. Patient did note that she felt sedated and that the "Psych medications maybe were too much." Update: contacted by AD Nurse who reported RN team noted orthostatic hypotension sx for patient.     T(C): 36.3 (03-11-23 @ 16:09), Max: 36.8 (03-10-23 @ 20:37)  HR: 86 (03-11-23 @ 17:17) (86 - 88)  BP: 135/70 (03-10-23 @ 20:37) (135/70 - 135/70)  RR: 18 (03-11-23 @ 05:26) (16 - 18)  SpO2: 96% (03-11-23 @ 17:17) (82% - 99%)    Physical Exam:  Gen: Patient sitting on chair, NAD   HEENT: NC/AT,  EOMI.    Resp: CTA b/l. No wheezes, ronchi, or crackles.   CV: Radial pulses 2+ b/l, RRR, no murmurs.   Abd: soft, NTND, no guarding or rigidity. NABS.    Ext: ROM intact, no clubbing, edema, or cyanosis.   Neuro: awake, alert, grossly oriented.     Assessment:  AMARA called Medical Rapid Response. Pt clinically stable with exam otherwise unremarkable. Will use low dose Haldol instead of Seroquel for decreased risk of orthostatic hypotension and sedation. Will encourage good fluid intake.    Plan:  1. Will change to PRN PO Haldol 2mg q8 for severe agitation. No further medical intervention necessary at this time.   2. will continue to monitor routinely.   3. d/w RN staff
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
## 2023-03-11 NOTE — CONSULT NOTE ADULT - SUBJECTIVE AND OBJECTIVE BOX
HPI: Pt is 72F admitted to [facility] 3/10/23 for depression.  Red conjunctiva on right eye noted, concern for infectious conjunctivitis, antibiotic eye drops prescribed.  Pt reports that she has chronic itchy allergic eyes and rubs them, which makes them red.  She uses patanol eye drops.    PAST MEDICAL & SURGICAL HISTORY:  hypothyroidism      Review of Systems:   CONSTITUTIONAL: No fever, weight loss, or fatigue  EYES: see HPI  ENMT:  No difficulty hearing, tinnitus, vertigo; No sinus or throat pain  NECK: No pain or stiffness  RESPIRATORY: No cough, wheezing, chills or hemoptysis; No shortness of breath  CARDIOVASCULAR: No chest pain, palpitations, dizziness, or leg swelling  GASTROINTESTINAL: No abdominal or epigastric pain. No nausea, vomiting, or hematemesis; No diarrhea or constipation. No melena or hematochezia.  GENITOURINARY: No dysuria, frequency, hematuria, or incontinence  NEUROLOGICAL: No headaches, memory loss, loss of strength, numbness, or tremors  SKIN: No itching, burning, rashes, or lesions   LYMPH NODES: No enlarged glands  ENDOCRINE: No heat or cold intolerance; No hair loss  MUSCULOSKELETAL: No joint pain or swelling; No muscle, back, or extremity pain  HEME/LYMPH: No easy bruising, or bleeding gums  ALLERY AND IMMUNOLOGIC: No hives or eczema    Allergies    No Known Allergies    Intolerances        Social History: no etoh tob idu    FAMILY HISTORY: noncontributory      MEDICATIONS  (STANDING):  ARIPiprazole 5 milliGRAM(s) Oral daily  levothyroxine 50 MICROGram(s) Oral daily  ofloxacin 0.3% Solution 1 Drop(s) Right EYE four times a day    MEDICATIONS  (PRN):  LORazepam     Tablet 0.25 milliGRAM(s) Oral every 6 hours PRN Anxiety  LORazepam   Injectable 1 milliGRAM(s) IntraMuscular Once PRN extreme anxiety/agitation      Vital Signs Last 24 Hrs  T(C): 36.3 (11 Mar 2023 05:26), Max: 36.8 (10 Mar 2023 20:37)  T(F): 97.4 (11 Mar 2023 05:26), Max: 98.2 (10 Mar 2023 20:37)  HR: 88 (10 Mar 2023 20:37) (88 - 92)  BP: 135/70 (10 Mar 2023 20:37) (135/70 - 147/72)  BP(mean): --  RR: 18 (11 Mar 2023 05:26) (16 - 18)  SpO2: 99% (10 Mar 2023 20:37) (99% - 99%)    Parameters below as of 10 Mar 2023 20:37  Patient On (Oxygen Delivery Method): room air      CAPILLARY BLOOD GLUCOSE            PHYSICAL EXAM:  GENERAL: NAD, well-developed  HEAD:  Atraumatic, Normocephalic  EYES: EOMI, pink sclera/conjunctiva on R, no discharge  NECK: Supple, No JVD  CHEST/LUNG: Clear to auscultation bilaterally; No wheeze  HEART: Regular rate and rhythm; No murmurs, rubs, or gallops  ABDOMEN: Soft, Nontender, Nondistended; Bowel sounds present  EXTREMITIES:  2+ Peripheral Pulses, No clubbing, cyanosis, or edema  NEUROLOGY: non-focal  SKIN: No rashes or lesions    LABS:                        13.5   7.84  )-----------( 228      ( 10 Mar 2023 16:47 )             41.9     03-10    134<L>  |  98  |  8   ----------------------------<  91  3.9   |  24  |  0.50    Ca    9.0      10 Mar 2023 16:47    TPro  6.8  /  Alb  4.1  /  TBili  0.7  /  DBili  x   /  AST  12  /  ALT  9   /  AlkPhos  63  03-10          Urinalysis Basic - ( 10 Mar 2023 16:53 )    Color: Yellow / Appearance: Clear / S.013 / pH: x  Gluc: x / Ketone: Small  / Bili: Negative / Urobili: <2 mg/dL   Blood: x / Protein: Negative / Nitrite: Negative   Leuk Esterase: Negative / RBC: 0-2 /HPF / WBC 0-2 /HPF   Sq Epi: x / Non Sq Epi: x / Bacteria: Negative

## 2023-03-11 NOTE — BH INPATIENT PSYCHIATRY ASSESSMENT NOTE - OTHER PAST PSYCHIATRIC HISTORY (INCLUDE DETAILS REGARDING ONSET, COURSE OF ILLNESS, INPATIENT/OUTPATIENT TREATMENT)
PPH MDD. Hx of 1 past inpatient admission 10-15 years ago (hospital unknown). Hx of 1 past suicide attempt 10-15 years ago via cutting- patient reports she required stitches. No current outpatient treater. Patient reported chronically delusional over last few years    No history of bipolar disorder symptoms or psychosis

## 2023-03-11 NOTE — BH INPATIENT PSYCHIATRY ASSESSMENT NOTE - CURRENT MEDICATION
MEDICATIONS  (STANDING):  ARIPiprazole 5 milliGRAM(s) Oral daily  levothyroxine 50 MICROGram(s) Oral daily    MEDICATIONS  (PRN):  ketotifen 0.025% Ophthalmic Solution 1 Drop(s) Both EYES two times a day PRN allergic conjunctivitis  LORazepam   Injectable 1 milliGRAM(s) IntraMuscular Once PRN extreme anxiety/agitation  QUEtiapine 12.5 milliGRAM(s) Oral every 6 hours PRN agitation
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
## 2023-03-11 NOTE — BH INPATIENT PSYCHIATRY ASSESSMENT NOTE - NSBHCHARTREVIEWVS_PSY_A_CORE FT
Vital Signs Last 24 Hrs  T(C): 36.3 (03-11-23 @ 05:26), Max: 36.8 (03-10-23 @ 20:37)  T(F): 97.4 (03-11-23 @ 05:26), Max: 98.2 (03-10-23 @ 20:37)  HR: 88 (03-10-23 @ 20:37) (88 - 92)  BP: 135/70 (03-10-23 @ 20:37) (135/70 - 147/72)  BP(mean): --  RR: 18 (03-11-23 @ 05:26) (16 - 18)  SpO2: 99% (03-10-23 @ 20:37) (99% - 99%)    Orthostatic VS  03-11-23 @ 05:26  Lying BP: 143/82 HR: 92  Sitting BP: 142/74 HR: 104  Standing BP: --/-- HR: --  Site: upper right arm  Mode: electronic  Orthostatic VS  03-10-23 @ 11:30  Lying BP: --/-- HR: --  Sitting BP: 148/85 HR: 95  Standing BP: 135/85 HR: 104  Site: upper left arm  Mode: electronic

## 2023-03-11 NOTE — BH CHART NOTE - NSEVENTNOTEFT_PSY_ALL_CORE
AMARA was called to evaluate the patient for suicidality as patient was noted by RN team to have banged her head on the radiator and was teary. Upon evaluation, patient was calm. She stated that she was provided with two medications and feels kind of drowsy now. She denies any SI/HI, A/V hallucination, and any current urges for self harm. She states that she does not want to worry her family and would not harm herself on the unit. She states that if she has any concerning thoughts, she plans on approaching RN staff for assistance. Discussed with RN team. No need for 1:1 CO at this time.

## 2023-03-11 NOTE — BH PATIENT PROFILE - STATED REASON FOR ADMISSION
Pt admitting Dx of MDD . Pt stated that feeling sad and even after I started to take my medication .No interesting doing daily activity and routine., was thinking about cutting herself, affect was sad .Pt spouse brought her to [facility] for evaluation. Upon arrival pt was calm, quiet and cooperative to admission process . Currently pt denies any self injury behavior /S/I. No complaints of pain or discomfort noted .Body check done .Skin intact ,gait is steady .Rt eye is red, no discharge noted .As per [facility] pt has conjunctivitis but pt sated that's due to allergy and pt was rubbing the eye. Routine check maintained. After snacks pt went to bed and sleeping.

## 2023-03-11 NOTE — BH INPATIENT PSYCHIATRY ASSESSMENT NOTE - NSBHMETABOLIC_PSY_ALL_CORE_FT
BMI: BMI (kg/m2): 27.1 (03-10-23 @ 23:42)  HbA1c:   Glucose:   BP: 135/70 (03-10-23 @ 20:37) (135/70 - 147/72)  Lipid Panel:

## 2023-03-11 NOTE — BH PATIENT PROFILE - NSPROMEDSADMININFO_GEN_A_NUR

## 2023-03-11 NOTE — PSYCHIATRIC REHAB INITIAL EVALUATION - PRIMARY SOURCE OF SUPPORT/COMFORT
Pt reports that her youngest daughter is supportive as well as her sister. Pt's oldest daughter resides in [place]. She has a difficulty relationship with her , however, he tries to be supportive. Pt enjoys spending time with her 3 grandchildren.

## 2023-03-11 NOTE — CONSULT NOTE ADULT - ASSESSMENT
72F admitted for depression with allergic conjunctivitis    Plan:  Allergic conjunctivitis: Not infectious, no need for bedblock.  D/c oflox eye drops.  Will use formulary alternative to patanol, ketotifen drops prn.    Hypothyroidism: Continue synthroid, TSH in target range.    Depression: Management per primary team.
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
## 2023-03-11 NOTE — BH INPATIENT PSYCHIATRY ASSESSMENT NOTE - MSE UNSTRUCTURED FT
Patient cooperative, appropriately groomed , dressed. Sl irritation R eye. Has superficial lacerations both wrists.  Nl speech and activity. Mood dysphoric. Patient denied current SI, sasy she is not sure why she cut risk, says was impulsive when she was "out of my mind" and denies any clear plan to cut wrist to die by exsanguination. Has apparent delusional jealousy. No other delusions.  Patient focused on current stressors including several sick relatives. No hallucinations. No guilt hopelessness, has low self esteem.

## 2023-03-12 LAB
ALBUMIN SERPL ELPH-MCNC: 4.4 G/DL — SIGNIFICANT CHANGE UP (ref 3.3–5)
ALP SERPL-CCNC: 66 U/L — SIGNIFICANT CHANGE UP (ref 40–120)
ALT FLD-CCNC: 9 U/L — SIGNIFICANT CHANGE UP (ref 4–33)
ANION GAP SERPL CALC-SCNC: 11 MMOL/L — SIGNIFICANT CHANGE UP (ref 7–14)
AST SERPL-CCNC: 10 U/L — SIGNIFICANT CHANGE UP (ref 4–32)
BASOPHILS # BLD AUTO: 0.01 K/UL — SIGNIFICANT CHANGE UP (ref 0–0.2)
BASOPHILS NFR BLD AUTO: 0.1 % — SIGNIFICANT CHANGE UP (ref 0–2)
BILIRUB SERPL-MCNC: 0.9 MG/DL — SIGNIFICANT CHANGE UP (ref 0.2–1.2)
BUN SERPL-MCNC: 10 MG/DL — SIGNIFICANT CHANGE UP (ref 7–23)
CALCIUM SERPL-MCNC: 9.2 MG/DL — SIGNIFICANT CHANGE UP (ref 8.4–10.5)
CHLORIDE SERPL-SCNC: 97 MMOL/L — LOW (ref 98–107)
CO2 SERPL-SCNC: 27 MMOL/L — SIGNIFICANT CHANGE UP (ref 22–31)
CREAT SERPL-MCNC: 0.52 MG/DL — SIGNIFICANT CHANGE UP (ref 0.5–1.3)
EGFR: 98 ML/MIN/1.73M2 — SIGNIFICANT CHANGE UP
EOSINOPHIL # BLD AUTO: 0.04 K/UL — SIGNIFICANT CHANGE UP (ref 0–0.5)
EOSINOPHIL NFR BLD AUTO: 0.6 % — SIGNIFICANT CHANGE UP (ref 0–6)
GLUCOSE SERPL-MCNC: 92 MG/DL — SIGNIFICANT CHANGE UP (ref 70–99)
HCT VFR BLD CALC: 42.7 % — SIGNIFICANT CHANGE UP (ref 34.5–45)
HGB BLD-MCNC: 13.8 G/DL — SIGNIFICANT CHANGE UP (ref 11.5–15.5)
IANC: 4.9 K/UL — SIGNIFICANT CHANGE UP (ref 1.8–7.4)
IMM GRANULOCYTES NFR BLD AUTO: 0.3 % — SIGNIFICANT CHANGE UP (ref 0–0.9)
LYMPHOCYTES # BLD AUTO: 1.41 K/UL — SIGNIFICANT CHANGE UP (ref 1–3.3)
LYMPHOCYTES # BLD AUTO: 19.8 % — SIGNIFICANT CHANGE UP (ref 13–44)
MCHC RBC-ENTMCNC: 27 PG — SIGNIFICANT CHANGE UP (ref 27–34)
MCHC RBC-ENTMCNC: 32.3 GM/DL — SIGNIFICANT CHANGE UP (ref 32–36)
MCV RBC AUTO: 83.6 FL — SIGNIFICANT CHANGE UP (ref 80–100)
MONOCYTES # BLD AUTO: 0.74 K/UL — SIGNIFICANT CHANGE UP (ref 0–0.9)
MONOCYTES NFR BLD AUTO: 10.4 % — SIGNIFICANT CHANGE UP (ref 2–14)
NEUTROPHILS # BLD AUTO: 4.9 K/UL — SIGNIFICANT CHANGE UP (ref 1.8–7.4)
NEUTROPHILS NFR BLD AUTO: 68.8 % — SIGNIFICANT CHANGE UP (ref 43–77)
NRBC # BLD: 0 /100 WBCS — SIGNIFICANT CHANGE UP (ref 0–0)
NRBC # FLD: 0 K/UL — SIGNIFICANT CHANGE UP (ref 0–0)
PLATELET # BLD AUTO: 213 K/UL — SIGNIFICANT CHANGE UP (ref 150–400)
POTASSIUM SERPL-MCNC: 3.8 MMOL/L — SIGNIFICANT CHANGE UP (ref 3.5–5.3)
POTASSIUM SERPL-SCNC: 3.8 MMOL/L — SIGNIFICANT CHANGE UP (ref 3.5–5.3)
PROT SERPL-MCNC: 6.8 G/DL — SIGNIFICANT CHANGE UP (ref 6–8.3)
RBC # BLD: 5.11 M/UL — SIGNIFICANT CHANGE UP (ref 3.8–5.2)
RBC # FLD: 13.3 % — SIGNIFICANT CHANGE UP (ref 10.3–14.5)
SODIUM SERPL-SCNC: 135 MMOL/L — SIGNIFICANT CHANGE UP (ref 135–145)
WBC # BLD: 7.12 K/UL — SIGNIFICANT CHANGE UP (ref 3.8–10.5)
WBC # FLD AUTO: 7.12 K/UL — SIGNIFICANT CHANGE UP (ref 3.8–10.5)

## 2023-03-12 PROCEDURE — 99233 SBSQ HOSP IP/OBS HIGH 50: CPT

## 2023-03-12 RX ADMIN — Medication 50 MICROGRAM(S): at 06:32

## 2023-03-12 NOTE — BH INPATIENT PSYCHIATRY PROGRESS NOTE - NSBHASSESSSUMMFT_PSY_ALL_CORE
Patient is a 72 year old  retired  female currently residing in a private residence with  (daughter/son in law and 3 grandchildren live upstairs in a mother/daughter home). PPH MDD. Hx of 1 past inpatient admission 10-15 years ago (hospital unknown). Hx of 1 past suicide attempt 10-15 years ago via cutting- patient reports she required stitches. No history of aggression/violence, legal issues or substance use issues. PMH- Hypothyroidism. BIB  for depression.    Patient presents to the ED in the context of depression. Patient endorses stopping psychiatric medication and over the last 2 weeks has had onset of depressive symptoms. She has multiple psychosocial stressors- various family members are ill. Patient had been having passive suicidal ideation but yesterday engaged in SIB- cutting self with scissors with ambivalent suicidal intent. She continues to endorse depression, anhedonia and  is not at baseline. She is unpredictable and impulsive and is requesting and agreeing to voluntary inpatient admission.    MDD   Cognitive impairment vs. Pseudo dementia symptoms related to MDD  Delusional Disorder? - chronic thoughts  is cheating on her    modifiable risk factors- depression, suicidal behavior, unpredictable bx, anhedonia, multiple psychosocial stressors    unmodifiable risk factors- history of inpatient admission, history of suicide attempt, r/o trauma?    protective factors- no substance use, no nury/hypomania, no psychosis.    3/12 Started CO due to self injurious behavior. Will d/c abilify and PRN haldol to address unusual behavior/low BP yesterday, noncompliant with zoloft, to f/u CT head and consider neuro/medical evaluation tomorrow.  F/U complianCE

## 2023-03-12 NOTE — BH INPATIENT PSYCHIATRY PROGRESS NOTE - NSBHFUPINTERVALHXFT_PSY_A_CORE
Patient seen for depression with psychosis. Chart and history reviewed and discussed with nursing team. Per nursing, Pt exhibited self injurious behavior yesterday when she banged her head on the radiator and was teary. Pt was seen by AMARA, I reviewed the chart note. In the evening again, AMARA was called for MEDICAL RAPID RESPONSE as patient was noted by RN to be unresponsive even with painful stimuli and a low SaO2. Pt was again evaluated by AMARA, I reviewed the chart note. Pt refused her zoloft, took medical medication. She complied with her abilify yesterday and received PRN seroquel yesterday afternoon.  On exam today, Pt reports not feeling good, depressed as 'she is blamed by her daughter for something she did not do'. When asked about her head banging yesterday, Pt reports that she was angry, 'Why me..'. She reports poor sleep and appetite. When asked about her evening episode, she says, she remembers that she went to sleep while on chair. Denies any suicidal/homicidal ideas, paranoia, hallucination. However she is noted to be isolated, internally preoccupied and withdrawn. Pt denies any pain in head, nausea, blurred vision, small redness on forehead noted. She is orientedx3. Denies any weakness. I spoke with hospitalist, CBC, CMP and CT head ordered as per conversation. Pt will be followed by medicine. Labs reviewed as below, CT head scheduled this afternoon, signed out to AMARA.

## 2023-03-12 NOTE — BH INPATIENT PSYCHIATRY PROGRESS NOTE - NSBHMETABOLIC_PSY_ALL_CORE_FT
BMI: BMI (kg/m2): 27.1 (03-10-23 @ 23:42)  HbA1c:   Glucose: POCT Blood Glucose.: 101 mg/dL (03-11-23 @ 17:05)    BP: 135/70 (03-10-23 @ 20:37) (135/70 - 147/72)  Lipid Panel:

## 2023-03-13 ENCOUNTER — EMERGENCY (EMERGENCY)
Facility: HOSPITAL | Age: 73
LOS: 1 days | Discharge: HOME | End: 2023-03-13
Attending: STUDENT IN AN ORGANIZED HEALTH CARE EDUCATION/TRAINING PROGRAM | Admitting: STUDENT IN AN ORGANIZED HEALTH CARE EDUCATION/TRAINING PROGRAM
Payer: MEDICARE

## 2023-03-13 VITALS
RESPIRATION RATE: 16 BRPM | SYSTOLIC BLOOD PRESSURE: 154 MMHG | HEIGHT: 66 IN | OXYGEN SATURATION: 97 % | HEART RATE: 86 BPM | TEMPERATURE: 98 F | DIASTOLIC BLOOD PRESSURE: 64 MMHG

## 2023-03-13 VITALS
HEART RATE: 82 BPM | DIASTOLIC BLOOD PRESSURE: 69 MMHG | SYSTOLIC BLOOD PRESSURE: 142 MMHG | RESPIRATION RATE: 14 BRPM | OXYGEN SATURATION: 100 % | TEMPERATURE: 98 F

## 2023-03-13 LAB
A1C WITH ESTIMATED AVERAGE GLUCOSE RESULT: 5.4 % — SIGNIFICANT CHANGE UP (ref 4–5.6)
ANION GAP SERPL CALC-SCNC: 14 MMOL/L — SIGNIFICANT CHANGE UP (ref 7–14)
BUN SERPL-MCNC: 9 MG/DL — SIGNIFICANT CHANGE UP (ref 7–23)
CALCIUM SERPL-MCNC: 9.1 MG/DL — SIGNIFICANT CHANGE UP (ref 8.4–10.5)
CHLORIDE SERPL-SCNC: 98 MMOL/L — SIGNIFICANT CHANGE UP (ref 98–107)
CHOLEST SERPL-MCNC: 158 MG/DL — SIGNIFICANT CHANGE UP
CO2 SERPL-SCNC: 24 MMOL/L — SIGNIFICANT CHANGE UP (ref 22–31)
CREAT SERPL-MCNC: 0.53 MG/DL — SIGNIFICANT CHANGE UP (ref 0.5–1.3)
EGFR: 98 ML/MIN/1.73M2 — SIGNIFICANT CHANGE UP
ESTIMATED AVERAGE GLUCOSE: 108 — SIGNIFICANT CHANGE UP
FOLATE SERPL-MCNC: 12.7 NG/ML — SIGNIFICANT CHANGE UP (ref 3.1–17.5)
GLUCOSE SERPL-MCNC: 96 MG/DL — SIGNIFICANT CHANGE UP (ref 70–99)
HDLC SERPL-MCNC: 66 MG/DL — SIGNIFICANT CHANGE UP
LIPID PNL WITH DIRECT LDL SERPL: 81 MG/DL — SIGNIFICANT CHANGE UP
NON HDL CHOLESTEROL: 92 MG/DL — SIGNIFICANT CHANGE UP
POTASSIUM SERPL-MCNC: 3.4 MMOL/L — LOW (ref 3.5–5.3)
POTASSIUM SERPL-SCNC: 3.4 MMOL/L — LOW (ref 3.5–5.3)
SODIUM SERPL-SCNC: 136 MMOL/L — SIGNIFICANT CHANGE UP (ref 135–145)
TRIGL SERPL-MCNC: 55 MG/DL — SIGNIFICANT CHANGE UP
VIT B12 SERPL-MCNC: 570 PG/ML — SIGNIFICANT CHANGE UP (ref 200–900)

## 2023-03-13 PROCEDURE — 70450 CT HEAD/BRAIN W/O DYE: CPT | Mod: 26,MA

## 2023-03-13 PROCEDURE — 99233 SBSQ HOSP IP/OBS HIGH 50: CPT

## 2023-03-13 PROCEDURE — 99284 EMERGENCY DEPT VISIT MOD MDM: CPT

## 2023-03-13 RX ORDER — ACETAMINOPHEN 500 MG
975 TABLET ORAL ONCE
Refills: 0 | Status: COMPLETED | OUTPATIENT
Start: 2023-03-13 | End: 2023-03-13

## 2023-03-13 RX ADMIN — Medication 50 MICROGRAM(S): at 06:24

## 2023-03-13 RX ADMIN — Medication 975 MILLIGRAM(S): at 15:36

## 2023-03-13 RX ADMIN — SERTRALINE 25 MILLIGRAM(S): 25 TABLET, FILM COATED ORAL at 08:36

## 2023-03-13 RX ADMIN — Medication 975 MILLIGRAM(S): at 14:55

## 2023-03-13 NOTE — ED PROVIDER NOTE - PROGRESS NOTE DETAILS
Dilshad: Signout received from Dr. Crooks.  CT head shows no acute changes.  Patient can be discharged back to [facility].  Case discussed with [facility] resident on-call. Joe Emmanuel MD:  CT negative stable for dc

## 2023-03-13 NOTE — BH SOCIAL WORK INITIAL PSYCHOSOCIAL EVALUATION - OTHER PAST PSYCHIATRIC HISTORY (INCLUDE DETAILS REGARDING ONSET, COURSE OF ILLNESS, INPATIENT/OUTPATIENT TREATMENT)
Pt is a 72 Year old, resides with .  BIB  due to worsening depression. HX MDD with psychosis. Family report pt has been unable to attend to activities, ruminating about the past and accusing  of an affair.  denied, adding he has lung cancer.  Pt admitted [facility] ML5 and placed on C0 1:1 after she willfully bangs her head on radiator.  Pt is delusional, minimizes.       sw met with pt. pt signed release of info to Prior psychiatrist Leonel Gomez MD, [place]. pt  explained no longer in treatment for about 5 months-  reportedly  referred elsewhere but at that appointment the new provider reportedly reviewed a handoff letter and stated Dr Gomez ended relationship due to patient's 'memory' problems. Pt then explained she did not return to the new MD as she did not like the new MD. No other treatment provider arranged. Pt is a 72 Year old, resides with .  BIB  due to worsening depression and SI. pt cut self with a scissor on both writs.  HX MDD with psychosis. Hx of multiple psy hospitalizations. Family report pt has been unable to attend to activities, ruminating about the past and accusing  of an affair.  denied, adding he has lung cancer.  Pt admitted [facility] ML5 and placed on C0 1:1 after she willfully bangs her head on radiator.  Pt is delusional, minimizes.       sw met with pt. pt signed release of info to Prior psychiatrist Leonel Gomez MD, [place]. pt  explained no longer in treatment for about 5 months-  reportedly  referred elsewhere but at that appointment the new provider reportedly reviewed a handoff letter and stated Dr Gomez ended relationship due to patient's 'memory' problems. Pt then explained she did not return to the new MD as she did not like the new MD. No other treatment provider arranged. Pt is a 72 Year old, resides with .  BIB  due to worsening depression and SI. pt cut self with a scissor on both writs.  HX MDD with psychosis. Hx of multiple psy hospitalizations. Most recently 2002 after SI,   last seen July 2022, no show 2022  Family report pt has been unable to attend to activities, ruminating about the past and accusing  of an affair. Hx of  having an affair and substance abuse in early marriage.  denied having affair now.  has stage 4 lung cancer.  Pt admitted [facility] ML5 and placed on C0 1:1 after she willfully bangs her head on radiator.  Pt is delusional, minimizes.       3/13 -  met with pt. pt signed release of info to Prior psychiatrist Leonel Gomez MD, [place]. pt  explained no longer in treatment for about 5 months-  reportedly  referred elsewhere but at that appointment the new provider reportedly reviewed a handoff letter and stated Dr Gomez ended relationship due to patient's 'memory' problems. Pt then explained she did not return to the new MD as she did not like the new MD. No other treatment provider arranged.    (Attending follow up with Dr Gomez noted did not dismiss from practice nor suggest memory problems and provided above noted contact info with pt)

## 2023-03-13 NOTE — ED ADULT NURSE NOTE - OBJECTIVE STATEMENT
Pt received in [facility].  Pt is A+Ox3.  Pt is refusing interview at this time. Pt states she hit her head over the weekend and has some pain.  Pt will not elaborate further.  Lungs clear, abdomen soft.  Pt has a staff member from [facility] with her.  Will continue to assess.

## 2023-03-13 NOTE — ED ADULT NURSE NOTE - NSFALLRSKASSESASSIST_ED_ALL_ED
Quality 130: Documentation Of Current Medications In The Medical Record: Current Medications Documented Detail Level: Detailed no

## 2023-03-13 NOTE — ED ADULT TRIAGE NOTE - CHIEF COMPLAINT QUOTE
Coming from [facility] with staff c/o unwitnessed fall on Saturday, possible LOC and head strike, no blood thinner use or obvious injury, speaking clear, able to follow commands
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
## 2023-03-13 NOTE — ED PROVIDER NOTE - NSFOLLOWUPINSTRUCTIONS_ED_ALL_ED_FT
Advance activity as tolerated.  Continue all previously prescribed medications as directed unless otherwise instructed.  Follow up with your primary care physician in 48-72 hours- bring copies of your results.  Return to the ER for worsening or persistent symptoms, and/or ANY NEW OR CONCERNING SYMPTOMS. If you have issues obtaining follow up, please call: [phone] to obtain a doctor or specialist who takes your insurance in your area.  You may call [phone] to make an appointment with the internal medicine clinic.    Fall Prevention    WHAT YOU NEED TO KNOW:    Fall prevention includes ways to make your home and other areas safer. It also includes ways you can move more carefully to prevent a fall. Health conditions that cause changes in your blood pressure, vision, or muscle strength and coordination may increase your risk for falls. Medicines may also increase your risk for falls if they make you dizzy, weak, or sleepy.     DISCHARGE INSTRUCTIONS:    Call 911 or have someone else call if:     You have fallen and are unconscious.      You have fallen and cannot move part of your body.    Contact your healthcare provider if:     You have fallen and have pain or a headache.      You have questions or concerns about your condition or care.    Fall prevention tips:     Stand or sit up slowly. This may help you keep your balance and prevent falls.      Use assistive devices as directed. Your healthcare provider may suggest that you use a cane or walker to help you keep your balance. You may need to have grab bars put in your bathroom near the toilet or in the shower.      Wear shoes that fit well and have soles that . Wear shoes both inside and outside. Use slippers with good . Do not wear shoes with high heels.      Wear a personal alarm. This is a device that allows you to call 911 if you fall and need help. Ask your healthcare provider for more information.      Stay active. Exercise can help strengthen your muscles and improve your balance. Your healthcare provider may recommend water aerobics or walking. He or she may also recommend physical therapy to improve your coordination. Never start an exercise program without talking to your healthcare provider first.       Manage your medical conditions. Keep all appointments with your healthcare providers. Visit your eye doctor as directed.    Home safety tips:     Add items to prevent falls in the bathroom. Put nonslip strips on your bath or shower floor to prevent you from slipping. Use a bath mat if you do not have carpet in the bathroom. This will prevent you from falling when you step out of the bath or shower. Use a shower seat so you do not need to stand while you shower. Sit on the toilet or a chair in your bathroom to dry yourself and put on clothing. This will prevent you from losing your balance from drying or dressing yourself while you are standing.       Keep paths clear. Remove books, shoes, and other objects from walkways and stairs. Place cords for telephones and lamps out of the way so that you do not need to walk over them. Tape them down if you cannot move them. Remove small rugs. If you cannot remove a rug, secure it with double-sided tape. This will prevent you from tripping.       Install bright lights in your home. Use night lights to help light paths to the bathroom or kitchen. Always turn on the light before you start walking.      Keep items you use often on shelves within reach. Do not use a step stool to help you reach an item.      Paint or place reflective tape on the edges of your stairs. This will help you see the stairs better.    Follow up with your healthcare provider as directed: Write down your questions so you remember to ask them during your visits.

## 2023-03-13 NOTE — ED PROVIDER NOTE - OBJECTIVE STATEMENT
71-year-old female with past medical history of depression, currently admitted to [facility] for suicidal ideation, history of thyroid disorder as well presenting with chief complaint head injury.  Patient unable to elaborate on what exactly happened.  Per chart review patient was seen to be hitting her head against the radiator multiple times.  Unclear if patient had a true syncopal episode or LOC during the event.  Patient currently endorsing a mild headache.  No vision changes or nausea or vomiting.  No other   Medical complaints at this time.

## 2023-03-13 NOTE — CHART NOTE - NSCHARTNOTEFT_GEN_A_CORE
[facility] Inpatient to ED Transfer Summary    Reason for Transfer/Medical Summary: Head trauma      PAST MEDICAL & SURGICAL HISTORY:      Allergies    No Known Allergies    Intolerances        MEDICATIONS  (STANDING):  levothyroxine 50 MICROGram(s) Oral daily  sertraline 25 milliGRAM(s) Oral daily    MEDICATIONS  (PRN):  ketotifen 0.025% Ophthalmic Solution 1 Drop(s) Both EYES two times a day PRN allergic conjunctivitis  LORazepam   Injectable 1 milliGRAM(s) IntraMuscular Once PRN extreme anxiety/agitation      Vital Signs Last 24 Hrs  T(C): 36.8 (13 Mar 2023 08:28), Max: 37 (12 Mar 2023 15:45)  T(F): 98.2 (13 Mar 2023 08:28), Max: 98.6 (12 Mar 2023 15:45)  HR: --  BP: --  BP(mean): --  RR: 18 (13 Mar 2023 08:28) (18 - 18)  SpO2: 98% (13 Mar 2023 08:28) (96% - 98%)      CAPILLARY BLOOD GLUCOSE            PHYSICAL EXAM:  GENERAL: NAD, well-developed  HEAD:  Atraumatic, Normocephalic  EYES: EOMI, PERRLA, conjunctiva and sclera clear  NECK: Supple, No JVD  CHEST/LUNG: Clear to auscultation bilaterally; No wheeze  HEART: Regular rate and rhythm; No murmurs, rubs, or gallops  ABDOMEN: Soft, Nontender, Nondistended; Bowel sounds present  EXTREMITIES:  2+ Peripheral Pulses, No clubbing, cyanosis, or edema  PSYCH: AAOx3  NEUROLOGY: non-focal  SKIN: No rashes or lesions    LABS:                        13.8   7.12  )-----------( 213      ( 12 Mar 2023 11:28 )             42.7     03-13    136  |  98  |  9   ----------------------------<  96  3.4<L>   |  24  |  0.53    Ca    9.1      13 Mar 2023 08:00    TPro  6.8  /  Alb  4.4  /  TBili  0.9  /  DBili  x   /  AST  10  /  ALT  9   /  AlkPhos  66  03-12              Psychiatry Section:  Psychiatric Summary/[facility] admitting diagnosis:  Patient with hx depression suicide attempt, psych hospitalization in past. was on Abilify until 5 months ago stopped due to her concern about long term use. In setting of chronical jealous delusions and psychosocial stressors patient became increasingly dysphoric, anhedonic, withdrawn, culminating in impulsive wrist cutting with ambivalent intent to end life. Over the weekend pt was witnessed banging her head against a radiator and had 1 episode of urinary incontinence and LOC.     Psychiatric Recommendations:    Observation status (check one):   ( x) Constant Observation  ( ) Enhanced care  ( ) Routine checks    Risk Status (check all that apply if present):  (x ) at risk for suicide/self-injury  ( ) at risk for aggressive behavior  ( ) at risk for elopement  ( ) other risk:

## 2023-03-13 NOTE — ED PROVIDER NOTE - CLINICAL SUMMARY MEDICAL DECISION MAKING FREE TEXT BOX
Amena Crooks, ED Attendin-year-old female presenting for evaluation of head injury.  On exam, vital signs stable with no traumatic findings on physical exam.  Patient neurologically intact with no focal deficits.  Given age and possible LOC, will obtain head CT to assess for any underlying intracranial pathology.  Will trial Tylenol for headache.  Possible postconcussive headache.  Reassess to dispo.

## 2023-03-13 NOTE — ED PROVIDER NOTE - HEME LYMPH, MLM
Analgesia    
Off pressors.   
Patient with large gastric bubble on a.m. chest x-ray  Patient has had multiple burps and pass some gas  We will slowly advance diet and monitor  He is at high risk for aspiration  
Resolved  Encourage incentive spirometry at discharge    
Status post antibiotics  
Status post antibiotics, resolved  
s/p mitral valve repair 9/20  Follow-up with outpatient cardiothoracic surgery  Analgesia as needed  Routine postop care  
No adenopathy or splenomegaly. No cervical or inguinal lymphadenopathy.

## 2023-03-13 NOTE — ED ADULT NURSE NOTE - CHIEF COMPLAINT QUOTE
Coming from [facility] with staff c/o unwitnessed fall on Saturday, possible LOC and head strike, no blood thinner use or obvious injury, speaking clear, able to follow commands

## 2023-03-13 NOTE — ED PROVIDER NOTE - CPE EDP CARDIAC NORM
Tobacco Cessation Counseling: Tobacco cessation counseling and education was provided  The patient is sincerely urged to quit consumption of tobacco  He is not ready to quit tobacco  The numerous health risks of tobacco consumption were discussed  If he decides to quit, there are a number of helpful adjunctive aids, and he can see me to discuss nicotine replacement therapy, chantix, or bupropion anytime in the future  normal...

## 2023-03-13 NOTE — BH INPATIENT PSYCHIATRY PROGRESS NOTE - NSBHASSESSSUMMFT_PSY_ALL_CORE
Patient is a 72 year old  retired  female currently residing in a private residence with  (daughter/son in law and 3 grandchildren live upstairs in a mother/daughter home). PPH MDD. Hx of 1 past inpatient admission 10-15 years ago (hospital unknown). Hx of 1 past suicide attempt 10-15 years ago via cutting- patient reports she required stitches. No history of aggression/violence, legal issues or substance use issues. PMH- Hypothyroidism. BIB  for depression.    Patient presents to the ED in the context of depression. Patient endorses stopping psychiatric medication and over the last 2 weeks has had onset of depressive symptoms. She has multiple psychosocial stressors- various family members are ill. Patient had been having passive suicidal ideation but yesterday engaged in SIB- cutting self with scissors with ambivalent suicidal intent. She continues to endorse depression, anhedonia and  is not at baseline. She is unpredictable and impulsive and is requesting and agreeing to voluntary inpatient admission.    MDD   Cognitive impairment vs. Pseudo dementia symptoms related to MDD  Delusional Disorder? - chronic thoughts  is cheating on her    modifiable risk factors- depression, suicidal behavior, unpredictable bx, anhedonia, multiple psychosocial stressors    unmodifiable risk factors- history of inpatient admission, history of suicide attempt, r/o trauma?    protective factors- no substance use, no nury/hypomania, no psychosis.    3/12 Started CO due to self injurious behavior. Will d/c abilify and PRN haldol to address unusual behavior/low BP yesterday, noncompliant with zoloft, to f/u CT head and consider neuro/medical evaluation tomorrow.  F/U complianCE Patient is a 72 year old  retired  female currently residing in a private residence with  (daughter/son in law and 3 grandchildren live upstairs in a mother/daughter home). PPH MDD. Hx of 1 past inpatient admission 10-15 years ago (hospital unknown). Hx of 1 past suicide attempt 10-15 years ago via cutting- patient reports she required stitches. No history of aggression/violence, legal issues or substance use issues. PMH- Hypothyroidism. BIB  for depression.    Patient presents to the ED in the context of depression. Patient endorses stopping psychiatric medication and over the last 2 weeks has had onset of depressive symptoms. She has multiple psychosocial stressors- various family members are ill. Patient had been having passive suicidal ideation but yesterday engaged in SIB- cutting self with scissors with ambivalent suicidal intent. She continues to endorse depression, anhedonia and  is not at baseline. She is unpredictable and impulsive and is requesting and agreeing to voluntary inpatient admission.    3/13 Update: Transferring pt to the ED for head CT after pt was seen hitting her head against the radiator during the weekend. Pt appears dysphoric on encounter, reporting hx of chronic depression with recent worsening after stopping her Abilify, culminating on a suicide attempt cutting her wrists with a pair of scissors. Pt also endorsing long standing delusional jealousy. Daughter advocating for ECT treatment.     MDD   Cognitive impairment vs. Pseudo dementia symptoms related to MDD  Delusional Disorder? - chronic thoughts  is cheating on her    modifiable risk factors- depression, suicidal behavior, unpredictable bx, anhedonia, multiple psychosocial stressors    unmodifiable risk factors- history of inpatient admission, history of suicide attempt, r/o trauma?    protective factors- no substance use, no nury/hypomania, no psychosis.

## 2023-03-13 NOTE — BH SOCIAL WORK INITIAL PSYCHOSOCIAL EVALUATION - NSHIGHRISKBEHFT_PSY_ALL_CORE
hx of suicide attempts. as per family, most recently  - one year ago pt 'jumped out of the car' (as per3/10/2023 ED sw note)

## 2023-03-13 NOTE — ED PROVIDER NOTE - PATIENT PORTAL LINK FT
You can access the FollowMyHealth Patient Portal offered by [facility] by registering at the following website: [email or web address]. By joining [employer]’s FollowMyHealth portal, you will also be able to view your health information using other applications (apps) compatible with our system.

## 2023-03-13 NOTE — BH INPATIENT PSYCHIATRY PROGRESS NOTE - NSBHMETABOLIC_PSY_ALL_CORE_FT
BMI: BMI (kg/m2): 27.1 (03-13-23 @ 12:49)  HbA1c: A1C with Estimated Average Glucose Result: 5.4 % (03-13-23 @ 08:00)    Glucose: POCT Blood Glucose.: 101 mg/dL (03-11-23 @ 17:05)    BP: 135/70 (03-10-23 @ 20:37) (135/70 - 147/72)  Lipid Panel: Date/Time: 03-13-23 @ 08:00  Cholesterol, Serum: 158  Direct LDL: --  HDL Cholesterol, Serum: 66  Total Cholesterol/HDL Ration Measurement: --  Triglycerides, Serum: 55   BMI: BMI (kg/m2): 27.1 (03-13-23 @ 12:49)  HbA1c: A1C with Estimated Average Glucose Result: 5.4 % (03-13-23 @ 08:00)    Glucose: POCT Blood Glucose.: 101 mg/dL (03-11-23 @ 17:05)    BP: 135/70 (03-10-23 @ 20:37) (135/70 - 135/70)  Lipid Panel: Date/Time: 03-13-23 @ 08:00  Cholesterol, Serum: 158  Direct LDL: --  HDL Cholesterol, Serum: 66  Total Cholesterol/HDL Ration Measurement: --  Triglycerides, Serum: 55

## 2023-03-14 PROCEDURE — 99232 SBSQ HOSP IP/OBS MODERATE 35: CPT

## 2023-03-14 RX ORDER — ARIPIPRAZOLE 15 MG/1
5 TABLET ORAL DAILY
Refills: 0 | Status: DISCONTINUED | OUTPATIENT
Start: 2023-03-14 | End: 2023-03-22

## 2023-03-14 RX ORDER — LANOLIN ALCOHOL/MO/W.PET/CERES
3 CREAM (GRAM) TOPICAL ONCE
Refills: 0 | Status: COMPLETED | OUTPATIENT
Start: 2023-03-14 | End: 2023-03-14

## 2023-03-14 RX ORDER — HYDROXYZINE HCL 10 MG
25 TABLET ORAL EVERY 8 HOURS
Refills: 0 | Status: DISCONTINUED | OUTPATIENT
Start: 2023-03-14 | End: 2023-04-04

## 2023-03-14 RX ORDER — LOPERAMIDE HCL 2 MG
2 TABLET ORAL ONCE
Refills: 0 | Status: COMPLETED | OUTPATIENT
Start: 2023-03-14 | End: 2023-03-14

## 2023-03-14 RX ORDER — ESCITALOPRAM OXALATE 10 MG/1
5 TABLET, FILM COATED ORAL DAILY
Refills: 0 | Status: DISCONTINUED | OUTPATIENT
Start: 2023-03-14 | End: 2023-03-21

## 2023-03-14 RX ADMIN — Medication 2 MILLIGRAM(S): at 03:57

## 2023-03-14 RX ADMIN — Medication 25 MILLIGRAM(S): at 16:18

## 2023-03-14 RX ADMIN — Medication 50 MICROGRAM(S): at 06:14

## 2023-03-14 RX ADMIN — SERTRALINE 25 MILLIGRAM(S): 25 TABLET, FILM COATED ORAL at 08:16

## 2023-03-14 RX ADMIN — Medication 3 MILLIGRAM(S): at 20:54

## 2023-03-14 NOTE — BH INPATIENT PSYCHIATRY PROGRESS NOTE - NSBHFUPINTERVALHXFT_PSY_A_CORE
Patient seen for depression with psychosis. Chart and history reviewed and discussed with nursing team. Pt returned from ED last night, head CT revealed no trauma or bleeds. Pt remains on CO for self harming behavior. During encounter this morning she is found at bedside, appears dysphoric, with constricted affect. Pt denies any interval changes. Minimizing the consequences of stopping her medications, stating initially she stopped them because her dr told her she should not be on Abilify long term, later stating she stopped them because she had migraine headaches from them. Pt was ambivalent about re-starting her meds but agreed to try again. Will monitor for resurgence of headaches.   Spoke with patient's outpatient psychiatrist Dr. Gomez. He reports pt dropped out of his care and was a no show for her last f/u appointment scheduled in September 2022. The last time he saw her was July 2022. Dr. Gomez saw pt since her last suicide attempt in 2003 and reports she was stable on Celexa and Abilify but did have a tendency to stop her medications intermittently. Whenever that happened they were able to catch it and resume them before she decompensated too much. As per Dr. Gomez her beliefs  is having an affair waxed and waned through time.

## 2023-03-14 NOTE — BH INPATIENT PSYCHIATRY PROGRESS NOTE - NSBHMETABOLIC_PSY_ALL_CORE_FT
BMI: BMI (kg/m2): 27.1 (03-13-23 @ 12:49)  HbA1c: A1C with Estimated Average Glucose Result: 5.4 % (03-13-23 @ 08:00)    Glucose: POCT Blood Glucose.: 101 mg/dL (03-11-23 @ 17:05)    BP: 158/86 (03-13-23 @ 18:38) (158/86 - 158/86)  Lipid Panel: Date/Time: 03-13-23 @ 08:00  Cholesterol, Serum: 158  Direct LDL: --  HDL Cholesterol, Serum: 66  Total Cholesterol/HDL Ration Measurement: --  Triglycerides, Serum: 55

## 2023-03-14 NOTE — BH INPATIENT PSYCHIATRY PROGRESS NOTE - NSBHCHARTREVIEWVS_PSY_A_CORE FT
Vital Signs Last 24 Hrs  T(C): 37.1 (03-14-23 @ 08:05), Max: 37.1 (03-14-23 @ 08:05)  T(F): 98.7 (03-14-23 @ 08:05), Max: 98.7 (03-14-23 @ 08:05)  HR: 92 (03-13-23 @ 18:38) (82 - 92)  BP: 158/86 (03-13-23 @ 18:38) (142/69 - 158/86)  BP(mean): --  RR: 16 (03-13-23 @ 18:38) (14 - 16)  SpO2: 97% (03-14-23 @ 08:05) (97% - 100%)    Orthostatic VS  03-13-23 @ 08:28  Lying BP: --/-- HR: --  Sitting BP: 141/85 HR: 88  Standing BP: 145/74 HR: 98  Site: --  Mode: --

## 2023-03-14 NOTE — BH INPATIENT PSYCHIATRY PROGRESS NOTE - NSBHASSESSSUMMFT_PSY_ALL_CORE
Patient is a 72 year old  retired  female currently residing in a private residence with  (daughter/son in law and 3 grandchildren live upstairs in a mother/daughter home). PPH MDD. Hx of 1 past inpatient admission 10-15 years ago (hospital unknown). Hx of 1 past suicide attempt 10-15 years ago via cutting- patient reports she required stitches. No history of aggression/violence, legal issues or substance use issues. PMH- Hypothyroidism. BIB  for depression.    Patient presents to the ED in the context of depression. Patient endorses stopping psychiatric medication and over the last 2 weeks has had onset of depressive symptoms. She has multiple psychosocial stressors- various family members are ill. Patient had been having passive suicidal ideation but yesterday engaged in SIB- cutting self with scissors with ambivalent suicidal intent. She continues to endorse depression, anhedonia and  is not at baseline. She is unpredictable and impulsive and is requesting and agreeing to voluntary inpatient admission.    3/13 Update: Transferring pt to the ED for head CT after pt was seen hitting her head against the radiator during the weekend. Pt appears dysphoric on encounter, reporting hx of chronic depression with recent worsening after stopping her Abilify, culminating on a suicide attempt cutting her wrists with a pair of scissors. Pt also endorsing long standing delusional jealousy. Daughter advocating for ECT treatment.     3/14 Update: Head CT WNL, pt denies interval changes. Remains on CO for suicidality. Agreed to restart Abilify and start Lexapro.     MDD   Cognitive impairment vs. Pseudo dementia symptoms related to MDD  Delusional Disorder? - chronic thoughts  is cheating on her  Start Lexapro 5mg QD (3/14)   Start Abilify 5mg QD (3/14), may consider Abilify Maintena to aid with compliance  Dispo: [facility]

## 2023-03-15 PROCEDURE — 99232 SBSQ HOSP IP/OBS MODERATE 35: CPT

## 2023-03-15 PROCEDURE — 99233 SBSQ HOSP IP/OBS HIGH 50: CPT

## 2023-03-15 RX ADMIN — Medication 50 MICROGRAM(S): at 06:06

## 2023-03-15 RX ADMIN — ESCITALOPRAM OXALATE 5 MILLIGRAM(S): 10 TABLET, FILM COATED ORAL at 09:36

## 2023-03-15 RX ADMIN — ARIPIPRAZOLE 5 MILLIGRAM(S): 15 TABLET ORAL at 09:36

## 2023-03-15 RX ADMIN — KETOTIFEN FUMARATE 1 DROP(S): 0.34 SOLUTION OPHTHALMIC at 18:52

## 2023-03-15 NOTE — ECT CONSULT NOTE - NSECTASSESSRECOMM_PSY_ALL_CORE
Pt with depression and possible psychotic sx's, por response to pharmacotherapy. Due to probable psychotic presentation and SI risk (p is currently on CO) pt is a good candidate

## 2023-03-15 NOTE — PROGRESS NOTE ADULT - ASSESSMENT
72F admitted for depression  with need for ECT, pre-procedure evaluation:   ECT specific risk assessment: pt without history of increased ICP, aneurysm, active pulmonary disease, valvular disease, CAD, cerebral vascular disease.     Cardiovascular risk assessment:   The patient is at low risk for cardiovascular complications from the low risk procedure, ECT.     Risk for complication is low. Patient is medically optimized for ECT treatment without further intervention and can proceed with treatment.    Anesthesia specific concerns :    History of adverse reaction to anesthesia previously: none  Esophageal Assessment: normal  Known Spine issues: none  CKD: not present    Further risk reduction will involve medical management of other comorbid conditions:      Allergic conjunctivitis: ketotifen drops prn.    Hypothyroidism: Continue synthroid, TSH in target range.

## 2023-03-15 NOTE — ECT CONSULT NOTE - OTHER PAST PSYCHIATRIC HISTORY (INCLUDE DETAILS REGARDING ONSET, COURSE OF ILLNESS, INPATIENT/OUTPATIENT TREATMENT)
72-year-old  retired  female currently residing in a private residence with  (daughter/son in law and 3 grandchildren live upstairs in a mother/daughter home). PPH MDD. Hx of 1 past inpatient admission 10-15 years ago (hospital unknown). Hx of 1 past suicide attempt 10-15 years ago via cutting- patient reports she required stitches. No history of aggression/violence, legal issues or substance use issues. PMH- Hypothyroidism. BIB  for depression.     As per admission, Patient reports she came to the ED because she just restarted her psychiatric medication and has not been feeling like she once did. she reports she stopped her medication because her MD told her she couldn't stay on it long term. However, over the last few weeks she started feeling depressed. Patient was vague and ambivalent regarding symptoms. She endorsed feeling sad, not going to the gym or going on walks with friends like she once did and doesn't find enjoyment in things. She stated 2 days ago she started having thoughts "I can't deal with this anymore," ie: her mental health issue. When questioned regarding thoughts to die/not be alive she stated, "I guess." She denied active SI/plan/intent. However, she admits to cutting self with a scissor yesterday on both her wrists but was unsure of her intent stating "I wasn't in my right mind. I just couldn't take it." When questioned if she was trying to kill herself, she was unsure and stated, "I guess." She was unable to name any reasons for NSSIB. She denied current SI/plan/intent.     On admission, she endorsed beliefs that her  was cheating on her with the woman across the street. She said she has believed this x years and referred to a party 2 years ago when her daughter called the neighbor her 's GF. She stated, "my family says I'm delusional about it." She stated she is showering and changing her clothes. Patient was then tearful stating her life is miserable and has been horrible.     Today, pt is evasive keeps saying that she can’t remember things. Sates that she is “overwhelmed” with her situation at home but does not elaborate. Does not talk about her  cheating on her. She reports that her mood is “very bad”, btu denies current SI, stating that she wants to “fix things at home”. She states that she wants to get better. She states shi she has been depressed “ a long time”, and that the medications don’t really help her. States that Abilify caused her to have severe migraines frequently.     Pt understands the nature of ECT procedure, states that she is scared but wants to get better. She said she would like to sign the consent, but would want to talk more with her . Pt is aware of the voluntary nature of the procedure.

## 2023-03-15 NOTE — BH INPATIENT PSYCHIATRY PROGRESS NOTE - NSBHASSESSSUMMFT_PSY_ALL_CORE
Patient is a 72 year old  retired  female currently residing in a private residence with  (daughter/son in law and 3 grandchildren live upstairs in a mother/daughter home). PPH MDD. Hx of 1 past inpatient admission 10-15 years ago (hospital unknown). Hx of 1 past suicide attempt 10-15 years ago via cutting- patient reports she required stitches. No history of aggression/violence, legal issues or substance use issues. PMH- Hypothyroidism. BIB  for depression.    Patient presents to the ED in the context of depression. Patient endorses stopping psychiatric medication and over the last 2 weeks has had onset of depressive symptoms. She has multiple psychosocial stressors- various family members are ill. Patient had been having passive suicidal ideation but yesterday engaged in SIB- cutting self with scissors with ambivalent suicidal intent. She continues to endorse depression, anhedonia and  is not at baseline. She is unpredictable and impulsive and is requesting and agreeing to voluntary inpatient admission.    3/13 Update: Transferring pt to the ED for head CT after pt was seen hitting her head against the radiator during the weekend. Pt appears dysphoric on encounter, reporting hx of chronic depression with recent worsening after stopping her Abilify, culminating on a suicide attempt cutting her wrists with a pair of scissors. Pt also endorsing long standing delusional jealousy. Daughter advocating for ECT treatment.     3/15 Update: Remains on CO for suicidality. Pt denies interval changes. Starting Abilify 5mg QD and Lexapro 5mg QD today. Pt interested in trying ECT. Consult placed. Hospitalist clearance pending. Pt given a copy of the ECT consent form.     MDD   Cognitive impairment vs. Pseudo dementia symptoms related to MDD  Delusional Disorder? - chronic thoughts  is cheating on her  Start Lexapro 5mg QD (3/14)   Start Abilify 5mg QD (3/14), may consider Abilify Maintena to aid with compliance  Dispo: [facility]

## 2023-03-15 NOTE — ECT CONSULT NOTE - CURRENT MEDICATION
MEDICATIONS  (STANDING):  ARIPiprazole 5 milliGRAM(s) Oral daily  escitalopram 5 milliGRAM(s) Oral daily  levothyroxine 50 MICROGram(s) Oral daily    MEDICATIONS  (PRN):  aluminum hydroxide/magnesium hydroxide/simethicone Suspension 30 milliLiter(s) Oral once PRN Dyspepsia  hydrOXYzine hydrochloride 25 milliGRAM(s) Oral every 8 hours PRN anxiety  ketotifen 0.025% Ophthalmic Solution 1 Drop(s) Both EYES two times a day PRN allergic conjunctivitis  LORazepam   Injectable 1 milliGRAM(s) IntraMuscular Once PRN extreme anxiety/agitation

## 2023-03-15 NOTE — ECT CONSULT NOTE - NSECTMENTALSTATUSEXAM_PSY_ALL_CORE
A, Ox3, cooperative, fairly groomed. Speech is fluent, normal volume. Thought is linear, no delusions elicited. Denies current SI, no perceptual disturbances. Mood is “very bad”, affect is restricted. Fair to poor insight and judgment.

## 2023-03-15 NOTE — BH INPATIENT PSYCHIATRY PROGRESS NOTE - NSBHFUPINTERVALHXFT_PSY_A_CORE
Patient seen for depression with psychosis. Chart and history reviewed and discussed with nursing team. Pt remains on CO for self injurious behavior. Pt refused to take Abilify yesterday. During our encounter today she confirmed she did want to try Abilify again and denied having refused yesterday. Pt states she did not know she was being offered Abilify. Pt continues to endorse feeling depressed, hopeless, lacking motivation and facing difficulty concentrating "I feel I have so much to tell you, but I don't know how to describe it". Pt requesting to try ECT. Family is on board.

## 2023-03-15 NOTE — PROGRESS NOTE ADULT - SUBJECTIVE AND OBJECTIVE BOX
CC/Reason for Consult: Pre-ECT evaluation    SUBJECTIVE / OVERNIGHT EVENTS:  Eyes no longer red.  Has had anesthesia in past without incident, bit she can't recall what for.  No hx CVA TIZ CAD CHR SOB PAUL.    MEDICATIONS  (STANDING):  ARIPiprazole 5 milliGRAM(s) Oral daily  escitalopram 5 milliGRAM(s) Oral daily  levothyroxine 50 MICROGram(s) Oral daily    MEDICATIONS  (PRN):  aluminum hydroxide/magnesium hydroxide/simethicone Suspension 30 milliLiter(s) Oral once PRN Dyspepsia  hydrOXYzine hydrochloride 25 milliGRAM(s) Oral every 8 hours PRN anxiety  ketotifen 0.025% Ophthalmic Solution 1 Drop(s) Both EYES two times a day PRN allergic conjunctivitis  LORazepam   Injectable 1 milliGRAM(s) IntraMuscular Once PRN extreme anxiety/agitation      Vital Signs Last 24 Hrs  T(C): 36 (15 Mar 2023 08:19), Max: 36.9 (14 Mar 2023 15:51)  T(F): 96.8 (15 Mar 2023 08:19), Max: 98.4 (14 Mar 2023 15:51)  HR: 91  BP: 143/68  BP(mean): --  RR: 15  SpO2: 97% (15 Mar 2023 08:19) (97% - 97%)                  PHYSICAL EXAM:  GENERAL: NAD, well-developed  HEAD:  Atraumatic, Normocephalic  EYES: EOMI, conjunctiva and sclera clear  NECK: Supple, No JVD  CHEST/LUNG: Clear to auscultation bilaterally; No wheeze  HEART: Regular rate and rhythm; No murmurs, rubs, or gallops  ABDOMEN: Soft, Nontender, Nondistended; Bowel sounds present  EXTREMITIES:  2+ Peripheral Pulses, No clubbing, cyanosis, or edema  PSYCH: AAOx3  NEUROLOGY: non-focal  SKIN: No rashes or lesions    LABS:  reviewed in sunrise             CC/Reason for Consult: Pre-ECT evaluation    SUBJECTIVE / OVERNIGHT EVENTS:  Eyes no longer red.  Has had anesthesia in past without incident, bit she can't recall what for.  No hx CVA TIZ CAD CHR SOB PAUL.    MEDICATIONS  (STANDING):  ARIPiprazole 5 milliGRAM(s) Oral daily  escitalopram 5 milliGRAM(s) Oral daily  levothyroxine 50 MICROGram(s) Oral daily    MEDICATIONS  (PRN):  aluminum hydroxide/magnesium hydroxide/simethicone Suspension 30 milliLiter(s) Oral once PRN Dyspepsia  hydrOXYzine hydrochloride 25 milliGRAM(s) Oral every 8 hours PRN anxiety  ketotifen 0.025% Ophthalmic Solution 1 Drop(s) Both EYES two times a day PRN allergic conjunctivitis  LORazepam   Injectable 1 milliGRAM(s) IntraMuscular Once PRN extreme anxiety/agitation      Vital Signs Last 24 Hrs  T(C): 36 (15 Mar 2023 08:19), Max: 36.9 (14 Mar 2023 15:51)  T(F): 96.8 (15 Mar 2023 08:19), Max: 98.4 (14 Mar 2023 15:51)  HR: 91  BP: 143/68  BP(mean): --  RR: 15  SpO2: 97% (15 Mar 2023 08:19) (97% - 97%)                  PHYSICAL EXAM:  GENERAL: NAD, well-developed  HEAD:  Atraumatic, Normocephalic  EYES: EOMI, conjunctiva and sclera clear  NECK: Supple, No JVD  CHEST/LUNG: Clear to auscultation bilaterally; No wheeze  HEART: Regular rate and rhythm; No murmurs, rubs, or gallops  ABDOMEN: Soft, Nontender, Nondistended; Bowel sounds present  EXTREMITIES:  2+ Peripheral Pulses, No clubbing, cyanosis, or edema  PSYCH: AAOx3  NEUROLOGY: non-focal  SKIN: No rashes or lesions    LABS:  reviewed in sunrise        EKG: NSR 84 nonspecific T wave abnormalities

## 2023-03-15 NOTE — BH INPATIENT PSYCHIATRY PROGRESS NOTE - NSBHCHARTREVIEWVS_PSY_A_CORE FT
Vital Signs Last 24 Hrs  T(C): 36 (03-15-23 @ 08:19), Max: 36.9 (03-14-23 @ 15:51)  T(F): 96.8 (03-15-23 @ 08:19), Max: 98.4 (03-14-23 @ 15:51)  HR: --  BP: --  BP(mean): --  RR: --  SpO2: 97% (03-15-23 @ 08:19) (97% - 97%)    Orthostatic VS  03-15-23 @ 08:19  Lying BP: --/-- HR: --  Sitting BP: 143/68 HR: 91  Standing BP: 123/63 HR: 97  Site: --  Mode: --

## 2023-03-16 LAB — SARS-COV-2 RNA SPEC QL NAA+PROBE: SIGNIFICANT CHANGE UP

## 2023-03-16 PROCEDURE — 99232 SBSQ HOSP IP/OBS MODERATE 35: CPT

## 2023-03-16 PROCEDURE — 90837 PSYTX W PT 60 MINUTES: CPT

## 2023-03-16 RX ADMIN — ARIPIPRAZOLE 5 MILLIGRAM(S): 15 TABLET ORAL at 08:44

## 2023-03-16 RX ADMIN — ESCITALOPRAM OXALATE 5 MILLIGRAM(S): 10 TABLET, FILM COATED ORAL at 08:44

## 2023-03-16 RX ADMIN — Medication 50 MICROGRAM(S): at 06:18

## 2023-03-16 NOTE — BH INPATIENT PSYCHIATRY PROGRESS NOTE - NSBHASSESSSUMMFT_PSY_ALL_CORE
Patient is a 72 year old  retired  female currently residing in a private residence with  (daughter/son in law and 3 grandchildren live upstairs in a mother/daughter home). PPH MDD. Hx of 1 past inpatient admission 10-15 years ago (hospital unknown). Hx of 1 past suicide attempt 10-15 years ago via cutting- patient reports she required stitches. No history of aggression/violence, legal issues or substance use issues. PMH- Hypothyroidism. BIB  for depression.    Patient presents to the ED in the context of depression. Patient endorses stopping psychiatric medication and over the last 2 weeks has had onset of depressive symptoms. She has multiple psychosocial stressors- various family members are ill. Patient had been having passive suicidal ideation but yesterday engaged in SIB- cutting self with scissors with ambivalent suicidal intent. She continues to endorse depression, anhedonia and  is not at baseline. She is unpredictable and impulsive and is requesting and agreeing to voluntary inpatient admission.    3/13 Update: Transferring pt to the ED for head CT after pt was seen hitting her head against the radiator during the weekend. Pt appears dysphoric on encounter, reporting hx of chronic depression with recent worsening after stopping her Abilify, culminating on a suicide attempt cutting her wrists with a pair of scissors. Pt also endorsing long standing delusional jealousy. Daughter advocating for ECT treatment.     3/15 Update: Remains on CO for suicidality. Pt denies interval changes. Starting Abilify 5mg QD and Lexapro 5mg QD today. Pt interested in trying ECT. Consult placed. Hospitalist clearance pending. Pt given a copy of the ECT consent form.     3/16: Pt remans on CO for self-injurious behavior. Continues to present depressed mood, psychomotor retardation and hopelessness. Stays in her room most of the day, withdrawn and negativistic. ECT #1 scheduled for tomorrow. Covid swab pending.     MDD   Cognitive impairment vs. Pseudo dementia symptoms related to MDD  Delusional Disorder? - chronic thoughts  is cheating on her  Start Lexapro 5mg QD (3/14)   Start Abilify 5mg QD (3/14), may consider Abilify Maintena to aid with compliance  Dispo: [facility]
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
## 2023-03-16 NOTE — BH INPATIENT PSYCHIATRY PROGRESS NOTE - NSBHCHARTREVIEWVS_PSY_A_CORE FT
Vital Signs Last 24 Hrs  T(C): 36.3 (03-16-23 @ 05:47), Max: 37.1 (03-15-23 @ 15:26)  T(F): 97.4 (03-16-23 @ 05:47), Max: 98.7 (03-15-23 @ 15:26)  HR: --  BP: --  BP(mean): --  RR: 16 (03-16-23 @ 05:47) (16 - 16)  SpO2: 97% (03-16-23 @ 05:47) (95% - 97%)    Orthostatic VS  03-16-23 @ 05:47  Lying BP: 151/72 HR: 85  Sitting BP: 135/71 HR: 91  Standing BP: --/-- HR: --  Site: --  Mode: --  Orthostatic VS  03-15-23 @ 08:19  Lying BP: --/-- HR: --  Sitting BP: 143/68 HR: 91  Standing BP: 123/63 HR: 97  Site: --  Mode: --

## 2023-03-17 LAB
ANION GAP SERPL CALC-SCNC: 11 MMOL/L — SIGNIFICANT CHANGE UP (ref 7–14)
BUN SERPL-MCNC: 6 MG/DL — LOW (ref 7–23)
CALCIUM SERPL-MCNC: 9.1 MG/DL — SIGNIFICANT CHANGE UP (ref 8.4–10.5)
CHLORIDE SERPL-SCNC: 100 MMOL/L — SIGNIFICANT CHANGE UP (ref 98–107)
CO2 SERPL-SCNC: 25 MMOL/L — SIGNIFICANT CHANGE UP (ref 22–31)
CREAT SERPL-MCNC: 0.56 MG/DL — SIGNIFICANT CHANGE UP (ref 0.5–1.3)
EGFR: 96 ML/MIN/1.73M2 — SIGNIFICANT CHANGE UP
GLUCOSE SERPL-MCNC: 96 MG/DL — SIGNIFICANT CHANGE UP (ref 70–99)
POTASSIUM SERPL-MCNC: 3.5 MMOL/L — SIGNIFICANT CHANGE UP (ref 3.5–5.3)
POTASSIUM SERPL-SCNC: 3.5 MMOL/L — SIGNIFICANT CHANGE UP (ref 3.5–5.3)
SODIUM SERPL-SCNC: 136 MMOL/L — SIGNIFICANT CHANGE UP (ref 135–145)

## 2023-03-17 PROCEDURE — 99232 SBSQ HOSP IP/OBS MODERATE 35: CPT

## 2023-03-17 RX ADMIN — ARIPIPRAZOLE 5 MILLIGRAM(S): 15 TABLET ORAL at 09:58

## 2023-03-17 RX ADMIN — ESCITALOPRAM OXALATE 5 MILLIGRAM(S): 10 TABLET, FILM COATED ORAL at 09:57

## 2023-03-17 NOTE — BH INPATIENT PSYCHIATRY PROGRESS NOTE - NSBHASSESSSUMMFT_PSY_ALL_CORE
Patient is a 72 year old  retired  female currently residing in a private residence with  (daughter/son in law and 3 grandchildren live upstairs in a mother/daughter home). PPH MDD. Hx of 1 past inpatient admission 10-15 years ago (hospital unknown). Hx of 1 past suicide attempt 10-15 years ago via cutting- patient reports she required stitches. No history of aggression/violence, legal issues or substance use issues. PMH- Hypothyroidism. BIB  for depression.    Patient presents to the ED in the context of depression. Patient endorses stopping psychiatric medication and over the last 2 weeks has had onset of depressive symptoms. She has multiple psychosocial stressors- various family members are ill. Patient had been having passive suicidal ideation but yesterday engaged in SIB- cutting self with scissors with ambivalent suicidal intent. She continues to endorse depression, anhedonia and  is not at baseline. She is unpredictable and impulsive and is requesting and agreeing to voluntary inpatient admission.    3/13 Update: Transferring pt to the ED for head CT after pt was seen hitting her head against the radiator during the weekend. Pt appears dysphoric on encounter, reporting hx of chronic depression with recent worsening after stopping her Abilify, culminating on a suicide attempt cutting her wrists with a pair of scissors. Pt also endorsing long standing delusional jealousy. Daughter advocating for ECT treatment.     3/15 Update: Remains on CO for suicidality. Pt denies interval changes. Starting Abilify 5mg QD and Lexapro 5mg QD today. Pt interested in trying ECT. Consult placed. Hospitalist clearance pending. Pt given a copy of the ECT consent form.     3/16: Pt remans on CO for self-injurious behavior. Continues to present depressed mood, psychomotor retardation and hopelessness. Stays in her room most of the day, withdrawn and negativistic. ECT #1 scheduled for tomorrow. Covid swab pending.     3/17: Pt remains on CO for self injurious behavior. Pt has been compliant with her medications. Remains withdraws, stays in her room most of the day, often sitting by the edge of her bed, with psychomotor retardation. Continues to reports low mood, passive SI, anhedonia, lack of motivation and difficulty concentrating.  Presentation suggestive of melancholic depression. Refused ECT today, agreed to try again Monday morning.     MDD with melancholic features  Cognitive impairment vs. Pseudo dementia symptoms related to MDD  Delusional Disorder? - chronic thoughts  is cheating on her  Start Lexapro 5mg QD (3/14)   Start Abilify 5mg QD (3/14), may consider Abilify Maintena to aid with compliance  Dispo: [facility]

## 2023-03-17 NOTE — BH SAFETY PLAN - INTERNAL COPING STRATEGY 1
Spending time with my family ( and grandchildren) Spending time with my family (, daughters, and grandchildren)

## 2023-03-17 NOTE — BH INPATIENT PSYCHIATRY PROGRESS NOTE - NSBHCHARTREVIEWVS_PSY_A_CORE FT
Vital Signs Last 24 Hrs  T(C): 36.5 (03-17-23 @ 06:28), Max: 36.7 (03-16-23 @ 15:57)  T(F): 97.7 (03-17-23 @ 06:28), Max: 98.1 (03-16-23 @ 15:57)  HR: --  BP: --  BP(mean): --  RR: --  SpO2: 96% (03-16-23 @ 15:57) (96% - 96%)    Orthostatic VS  03-17-23 @ 06:28  Lying BP: --/-- HR: --  Sitting BP: 125/69 HR: 87  Standing BP: 97/68 HR: 101  Site: --  Mode: --  Orthostatic VS  03-16-23 @ 05:47  Lying BP: 151/72 HR: 85  Sitting BP: 135/71 HR: 91  Standing BP: --/-- HR: --  Site: --  Mode: --

## 2023-03-17 NOTE — BH INPATIENT PSYCHIATRY PROGRESS NOTE - NSBHFUPINTERVALHXFT_PSY_A_CORE
Patient seen for depression with psychosis. Chart and history reviewed and discussed with nursing team. Pt remains on CO for self injurious behavior. Pt has been compliant with her medications. Remains withdraws, stays in her room most of the day, often sitting by the edge of her bed, with psychomotor retardation. Continues to reports low mood, passive SI, anhedonia, lack of motivation and difficulty concentrating.  Presentation suggestive of melancholic depression. Ms. Briceno refused to go for ECT today despite encouragement from staff and this writer. Writer offered to go with her, offered PRN medications to help alleviate anticipatory anxiety, despite all efforts pt refused and asked to try again Monday, "maybe I'll feel better after thinking more about it".

## 2023-03-18 PROCEDURE — 99232 SBSQ HOSP IP/OBS MODERATE 35: CPT

## 2023-03-18 RX ORDER — LOPERAMIDE HCL 2 MG
2 TABLET ORAL ONCE
Refills: 0 | Status: COMPLETED | OUTPATIENT
Start: 2023-03-18 | End: 2023-03-18

## 2023-03-18 RX ADMIN — Medication 30 MILLILITER(S): at 11:15

## 2023-03-18 RX ADMIN — Medication 2 MILLIGRAM(S): at 18:08

## 2023-03-18 RX ADMIN — Medication 25 MILLIGRAM(S): at 13:22

## 2023-03-18 RX ADMIN — ESCITALOPRAM OXALATE 5 MILLIGRAM(S): 10 TABLET, FILM COATED ORAL at 08:37

## 2023-03-18 RX ADMIN — ARIPIPRAZOLE 5 MILLIGRAM(S): 15 TABLET ORAL at 08:37

## 2023-03-18 RX ADMIN — Medication 50 MICROGRAM(S): at 06:18

## 2023-03-18 NOTE — BH INPATIENT PSYCHIATRY PROGRESS NOTE - NSBHCHARTREVIEWVS_PSY_A_CORE FT
Vital Signs Last 24 Hrs  T(C): 36.5 (03-18-23 @ 08:19), Max: 36.5 (03-18-23 @ 08:19)  T(F): 97.7 (03-18-23 @ 08:19), Max: 97.7 (03-18-23 @ 08:19)  HR: --  BP: --  BP(mean): --  RR: --  SpO2: 95% (03-18-23 @ 08:19) (95% - 97%)    Orthostatic VS  03-18-23 @ 08:19  Lying BP: 122/71 HR: 81  Sitting BP: 138/64 HR: 88  Standing BP: --/-- HR: --  Site: --  Mode: --  Orthostatic VS  03-17-23 @ 06:28  Lying BP: --/-- HR: --  Sitting BP: 125/69 HR: 87  Standing BP: 97/68 HR: 101  Site: --  Mode: --

## 2023-03-18 NOTE — BH INPATIENT PSYCHIATRY PROGRESS NOTE - NSBHASSESSSUMMFT_PSY_ALL_CORE
Patient is a 72 year old  retired  female currently residing in a private residence with  (daughter/son in law and 3 grandchildren live upstairs in a mother/daughter home). PPH MDD. Hx of 1 past inpatient admission 10-15 years ago (hospital unknown). Hx of 1 past suicide attempt 10-15 years ago via cutting- patient reports she required stitches. No history of aggression/violence, legal issues or substance use issues. PMH- Hypothyroidism. BIB  for depression.    Patient presents to the ED in the context of depression. Patient endorses stopping psychiatric medication and over the last 2 weeks has had onset of depressive symptoms. She has multiple psychosocial stressors- various family members are ill. Patient had been having passive suicidal ideation but yesterday engaged in SIB- cutting self with scissors with ambivalent suicidal intent. She continues to endorse depression, anhedonia and  is not at baseline. She is unpredictable and impulsive and is requesting and agreeing to voluntary inpatient admission.    3/13 Update: Transferring pt to the ED for head CT after pt was seen hitting her head against the radiator during the weekend. Pt appears dysphoric on encounter, reporting hx of chronic depression with recent worsening after stopping her Abilify, culminating on a suicide attempt cutting her wrists with a pair of scissors. Pt also endorsing long standing delusional jealousy. Daughter advocating for ECT treatment.     3/15 Update: Remains on CO for suicidality. Pt denies interval changes. Starting Abilify 5mg QD and Lexapro 5mg QD today. Pt interested in trying ECT. Consult placed. Hospitalist clearance pending. Pt given a copy of the ECT consent form.     3/16: Pt remans on CO for self-injurious behavior. Continues to present depressed mood, psychomotor retardation and hopelessness. Stays in her room most of the day, withdrawn and negativistic. ECT #1 scheduled for tomorrow. Covid swab pending.     3/17: Pt remains on CO for self injurious behavior. Pt has been compliant with her medications. Remains withdraws, stays in her room most of the day, often sitting by the edge of her bed, with psychomotor retardation. Continues to reports low mood, passive SI, anhedonia, lack of motivation and difficulty concentrating.  Presentation suggestive of melancholic depression. Refused ECT today, agreed to try again Monday morning.     3/18: depressed, withdrawn, isolated, minimally interactive.    MDD with melancholic features  Cognitive impairment vs. Pseudo dementia symptoms related to MDD  Delusional Disorder? - chronic thoughts  is cheating on her  Start Lexapro 5mg QD (3/14)   Start Abilify 5mg QD (3/14), may consider Abilify Maintena to aid with compliance  Dispo: [facility]

## 2023-03-18 NOTE — BH INPATIENT PSYCHIATRY PROGRESS NOTE - NSBHMETABOLIC_PSY_ALL_CORE_FT
BMI: BMI (kg/m2): 27.1 (03-13-23 @ 12:49)  HbA1c: A1C with Estimated Average Glucose Result: 5.4 % (03-13-23 @ 08:00)    Glucose: POCT Blood Glucose.: 101 mg/dL (03-11-23 @ 17:05)    BP: --  Lipid Panel: Date/Time: 03-13-23 @ 08:00  Cholesterol, Serum: 158  Direct LDL: --  HDL Cholesterol, Serum: 66  Total Cholesterol/HDL Ration Measurement: --  Triglycerides, Serum: 55

## 2023-03-18 NOTE — BH INPATIENT PSYCHIATRY PROGRESS NOTE - NSBHFUPINTERVALHXFT_PSY_A_CORE
Patient seen for depression with psychosis. Chart and history reviewed and discussed with nursing team. Pt remains on CO for self injurious behavior. Pt has been compliant with her medications, received PRN atarax ths am. Vitals are stable. On encounter today, patient reports not feeling good, depressed. When asked to clarify, she says, 'everything about me, I don't know'.  She remains withdrawn, stays in her room most of the day, often sitting by the edge of her bed, with psychomotor retardation. Reports lack of motivation and difficulty concentrating. Denies any SI, HI, hallucination, paranoia. Labs reviewed as noted below.

## 2023-03-19 LAB
BASOPHILS # BLD AUTO: 0.03 K/UL — SIGNIFICANT CHANGE UP (ref 0–0.2)
BASOPHILS NFR BLD AUTO: 0.5 % — SIGNIFICANT CHANGE UP (ref 0–2)
EOSINOPHIL # BLD AUTO: 0.1 K/UL — SIGNIFICANT CHANGE UP (ref 0–0.5)
EOSINOPHIL NFR BLD AUTO: 1.5 % — SIGNIFICANT CHANGE UP (ref 0–6)
HCT VFR BLD CALC: 39.5 % — SIGNIFICANT CHANGE UP (ref 34.5–45)
HGB BLD-MCNC: 13 G/DL — SIGNIFICANT CHANGE UP (ref 11.5–15.5)
IANC: 3.78 K/UL — SIGNIFICANT CHANGE UP (ref 1.8–7.4)
IMM GRANULOCYTES NFR BLD AUTO: 0.3 % — SIGNIFICANT CHANGE UP (ref 0–0.9)
LYMPHOCYTES # BLD AUTO: 1.81 K/UL — SIGNIFICANT CHANGE UP (ref 1–3.3)
LYMPHOCYTES # BLD AUTO: 27.5 % — SIGNIFICANT CHANGE UP (ref 13–44)
MCHC RBC-ENTMCNC: 27 PG — SIGNIFICANT CHANGE UP (ref 27–34)
MCHC RBC-ENTMCNC: 32.9 GM/DL — SIGNIFICANT CHANGE UP (ref 32–36)
MCV RBC AUTO: 82.1 FL — SIGNIFICANT CHANGE UP (ref 80–100)
MONOCYTES # BLD AUTO: 0.84 K/UL — SIGNIFICANT CHANGE UP (ref 0–0.9)
MONOCYTES NFR BLD AUTO: 12.8 % — SIGNIFICANT CHANGE UP (ref 2–14)
NEUTROPHILS # BLD AUTO: 3.78 K/UL — SIGNIFICANT CHANGE UP (ref 1.8–7.4)
NEUTROPHILS NFR BLD AUTO: 57.4 % — SIGNIFICANT CHANGE UP (ref 43–77)
NRBC # BLD: 0 /100 WBCS — SIGNIFICANT CHANGE UP (ref 0–0)
NRBC # FLD: 0 K/UL — SIGNIFICANT CHANGE UP (ref 0–0)
PLATELET # BLD AUTO: 204 K/UL — SIGNIFICANT CHANGE UP (ref 150–400)
RBC # BLD: 4.81 M/UL — SIGNIFICANT CHANGE UP (ref 3.8–5.2)
RBC # FLD: 13 % — SIGNIFICANT CHANGE UP (ref 10.3–14.5)
SARS-COV-2 RNA SPEC QL NAA+PROBE: SIGNIFICANT CHANGE UP
WBC # BLD: 6.58 K/UL — SIGNIFICANT CHANGE UP (ref 3.8–10.5)
WBC # FLD AUTO: 6.58 K/UL — SIGNIFICANT CHANGE UP (ref 3.8–10.5)

## 2023-03-19 RX ADMIN — ESCITALOPRAM OXALATE 5 MILLIGRAM(S): 10 TABLET, FILM COATED ORAL at 08:47

## 2023-03-19 RX ADMIN — Medication 50 MICROGRAM(S): at 05:44

## 2023-03-19 RX ADMIN — ARIPIPRAZOLE 5 MILLIGRAM(S): 15 TABLET ORAL at 08:47

## 2023-03-19 NOTE — BH INPATIENT PSYCHIATRY PROGRESS NOTE - NSBHASSESSSUMMFT_PSY_ALL_CORE
Patient is a 72 year old  retired  female currently residing in a private residence with  (daughter/son in law and 3 grandchildren live upstairs in a mother/daughter home). PPH MDD. Hx of 1 past inpatient admission 10-15 years ago (hospital unknown). Hx of 1 past suicide attempt 10-15 years ago via cutting- patient reports she required stitches. No history of aggression/violence, legal issues or substance use issues. PMH- Hypothyroidism. BIB  for depression.    Patient presents to the ED in the context of depression. Patient endorses stopping psychiatric medication and over the last 2 weeks has had onset of depressive symptoms. She has multiple psychosocial stressors- various family members are ill. Patient had been having passive suicidal ideation but yesterday engaged in SIB- cutting self with scissors with ambivalent suicidal intent. She continues to endorse depression, anhedonia and  is not at baseline. She is unpredictable and impulsive and is requesting and agreeing to voluntary inpatient admission.    3/13 Update: Transferring pt to the ED for head CT after pt was seen hitting her head against the radiator during the weekend. Pt appears dysphoric on encounter, reporting hx of chronic depression with recent worsening after stopping her Abilify, culminating on a suicide attempt cutting her wrists with a pair of scissors. Pt also endorsing long standing delusional jealousy. Daughter advocating for ECT treatment.     3/15 Update: Remains on CO for suicidality. Pt denies interval changes. Starting Abilify 5mg QD and Lexapro 5mg QD today. Pt interested in trying ECT. Consult placed. Hospitalist clearance pending. Pt given a copy of the ECT consent form.     3/16: Pt remans on CO for self-injurious behavior. Continues to present depressed mood, psychomotor retardation and hopelessness. Stays in her room most of the day, withdrawn and negativistic. ECT #1 scheduled for tomorrow. Covid swab pending.     3/17: Pt remains on CO for self injurious behavior. Pt has been compliant with her medications. Remains withdraws, stays in her room most of the day, often sitting by the edge of her bed, with psychomotor retardation. Continues to reports low mood, passive SI, anhedonia, lack of motivation and difficulty concentrating.  Presentation suggestive of melancholic depression. Refused ECT today, agreed to try again Monday morning.     3/18: depressed, withdrawn, isolated, minimally interactive.  3/19 remains isolated, preoccupied, depressed, compliant with meds, lab this am, agreed to go for ECT tomorrow.     MDD with melancholic features  Cognitive impairment vs. Pseudo dementia symptoms related to MDD  Delusional Disorder? - chronic thoughts  is cheating on her  Start Lexapro 5mg QD (3/14)   Start Abilify 5mg QD (3/14), may consider Abilify Maintena to aid with compliance  Dispo: [facility]

## 2023-03-19 NOTE — BH INPATIENT PSYCHIATRY PROGRESS NOTE - NSBHFUPINTERVALHXFT_PSY_A_CORE
Patient seen for depression with psychosis. Chart and history reviewed and discussed with nursing team. Pt remains on CO for self injurious behavior. Pt has been compliant with her medications, received PRN atarax yesterday. Vitals are stable. On encounter today, patient reports not feeling good, depressed. Reports appetite is low, had little breakfast and sleep was ok.  She remains withdrawn, stays in her room most of the day, isolated and minimally interactive. Reports low energy and no motivation. Pt agreed to got for ECT tomorrow. Denies any SI, HI, hallucination, paranoia. Labs reviewed as noted below.

## 2023-03-19 NOTE — BH INPATIENT PSYCHIATRY PROGRESS NOTE - NSBHCHARTREVIEWVS_PSY_A_CORE FT
Vital Signs Last 24 Hrs  T(C): 36.5 (03-19-23 @ 05:55), Max: 36.6 (03-18-23 @ 18:23)  T(F): 97.7 (03-19-23 @ 05:55), Max: 97.8 (03-18-23 @ 18:23)  HR: --  BP: --  BP(mean): --  RR: 18 (03-19-23 @ 05:55) (18 - 18)  SpO2: 91% (03-19-23 @ 05:55) (91% - 98%)    Orthostatic VS  03-19-23 @ 05:55  Lying BP: 115/59 HR: 62  Sitting BP: 128/61 HR: 71  Standing BP: --/-- HR: --  Site: --  Mode: --  Orthostatic VS  03-18-23 @ 08:19  Lying BP: 122/71 HR: 81  Sitting BP: 138/64 HR: 88  Standing BP: --/-- HR: --  Site: --  Mode: --

## 2023-03-20 PROCEDURE — 90837 PSYTX W PT 60 MINUTES: CPT

## 2023-03-20 PROCEDURE — 90870 ELECTROCONVULSIVE THERAPY: CPT

## 2023-03-20 PROCEDURE — 99232 SBSQ HOSP IP/OBS MODERATE 35: CPT | Mod: 25

## 2023-03-20 RX ADMIN — ESCITALOPRAM OXALATE 5 MILLIGRAM(S): 10 TABLET, FILM COATED ORAL at 14:31

## 2023-03-20 RX ADMIN — ARIPIPRAZOLE 5 MILLIGRAM(S): 15 TABLET ORAL at 14:31

## 2023-03-20 NOTE — BH INPATIENT PSYCHIATRY PROGRESS NOTE - NSBHCHARTREVIEWVS_PSY_A_CORE FT
Vital Signs Last 24 Hrs  T(C): 36.1 (03-20-23 @ 13:15), Max: 37.1 (03-19-23 @ 15:41)  T(F): 97 (03-20-23 @ 13:15), Max: 98.8 (03-19-23 @ 15:41)  HR: 78 (03-20-23 @ 12:55) (74 - 94)  BP: 148/65 (03-20-23 @ 12:55) (114/63 - 170/86)  BP(mean): --  RR: 20 (03-20-23 @ 12:55) (16 - 20)  SpO2: 100% (03-20-23 @ 12:55) (96% - 100%)    Orthostatic VS  03-20-23 @ 13:15  Lying BP: --/-- HR: --  Sitting BP: 141/63 HR: 73  Standing BP: 129/71 HR: 88  Site: --  Mode: --  Orthostatic VS  03-20-23 @ 06:19  Lying BP: 134/57 HR: 73  Sitting BP: 108/57 HR: 80  Standing BP: --/-- HR: --  Site: --  Mode: --  Orthostatic VS  03-19-23 @ 05:55  Lying BP: 115/59 HR: 62  Sitting BP: 128/61 HR: 71  Standing BP: --/-- HR: --  Site: --  Mode: --

## 2023-03-20 NOTE — BH INPATIENT PSYCHIATRY PROGRESS NOTE - NSBHASSESSSUMMFT_PSY_ALL_CORE
Patient is a 72 year old  retired  female currently residing in a private residence with  (daughter/son in law and 3 grandchildren live upstairs in a mother/daughter home). PPH MDD. Hx of 1 past inpatient admission 10-15 years ago (hospital unknown). Hx of 1 past suicide attempt 10-15 years ago via cutting- patient reports she required stitches. No history of aggression/violence, legal issues or substance use issues. PMH- Hypothyroidism. BIB  for depression.    Patient presents to the ED in the context of depression. Patient endorses stopping psychiatric medication and over the last 2 weeks has had onset of depressive symptoms. She has multiple psychosocial stressors- various family members are ill. Patient had been having passive suicidal ideation but yesterday engaged in SIB- cutting self with scissors with ambivalent suicidal intent. She continues to endorse depression, anhedonia and  is not at baseline. She is unpredictable and impulsive and is requesting and agreeing to voluntary inpatient admission.    3/13 Update: Transferring pt to the ED for head CT after pt was seen hitting her head against the radiator during the weekend. Pt appears dysphoric on encounter, reporting hx of chronic depression with recent worsening after stopping her Abilify, culminating on a suicide attempt cutting her wrists with a pair of scissors. Pt also endorsing long standing delusional jealousy. Daughter advocating for ECT treatment.     3/15 Update: Remains on CO for suicidality. Pt denies interval changes. Starting Abilify 5mg QD and Lexapro 5mg QD today. Pt interested in trying ECT. Consult placed. Hospitalist clearance pending. Pt given a copy of the ECT consent form.     3/16: Pt remans on CO for self-injurious behavior. Continues to present depressed mood, psychomotor retardation and hopelessness. Stays in her room most of the day, withdrawn and negativistic. ECT #1 scheduled for tomorrow. Covid swab pending.     3/17: Pt remains on CO for self injurious behavior. Pt has been compliant with her medications. Remains withdraws, stays in her room most of the day, often sitting by the edge of her bed, with psychomotor retardation. Continues to reports low mood, passive SI, anhedonia, lack of motivation and difficulty concentrating.  Presentation suggestive of melancholic depression. Refused ECT today, agreed to try again Monday morning.     3/18: depressed, withdrawn, isolated, minimally interactive.  3/19 remains isolated, preoccupied, depressed, compliant with meds, lab this am, agreed to go for ECT tomorrow.     3/20: Pt completed her 1st ECT today.  Pt presented with more reactive affect, with less thought blocking. Reports feeling better after ECT. Denies suicidal thoughts or thoughts of self harm at this time. If pt still denying SI tomorrow may d/c CO. Will continue ECT Wednesday and current med regimen.     MDD with melancholic features  Cognitive impairment vs. Pseudo dementia symptoms related to MDD  Delusional Disorder? - chronic thoughts  is cheating on her  Start Lexapro 5mg QD (3/14)   Start Abilify 5mg QD (3/14), may consider Abilify Maintena to aid with compliance  Dispo: [facility]

## 2023-03-20 NOTE — BH INPATIENT PSYCHIATRY PROGRESS NOTE - NSBHFUPINTERVALHXFT_PSY_A_CORE
Patient seen for depression with psychosis. Chart and history reviewed and discussed with nursing team. Pt remains on CO for self injurious behavior. Pt completed her 1st ECT today. Met with pt in the afternoon, after she returned to the unit. Pt presented with more reactive affect, with less thought blocking. Reports feeling better after ECT. Denies suicidal thoughts or thoughts of self harm at this time. If pt still denying SI tomorrow may d/c CO.

## 2023-03-20 NOTE — BH INPATIENT PSYCHIATRY PROGRESS NOTE - NSBHMETABOLIC_PSY_ALL_CORE_FT
BMI: BMI (kg/m2): 27.1 (03-13-23 @ 12:49)  HbA1c: A1C with Estimated Average Glucose Result: 5.4 % (03-13-23 @ 08:00)    Glucose: POCT Blood Glucose.: 101 mg/dL (03-11-23 @ 17:05)    BP: 148/65 (03-20-23 @ 12:55) (114/63 - 170/86)  Lipid Panel: Date/Time: 03-13-23 @ 08:00  Cholesterol, Serum: 158  Direct LDL: --  HDL Cholesterol, Serum: 66  Total Cholesterol/HDL Ration Measurement: --  Triglycerides, Serum: 55

## 2023-03-21 PROCEDURE — 99232 SBSQ HOSP IP/OBS MODERATE 35: CPT

## 2023-03-21 RX ORDER — ESCITALOPRAM OXALATE 10 MG/1
10 TABLET, FILM COATED ORAL DAILY
Refills: 0 | Status: DISCONTINUED | OUTPATIENT
Start: 2023-03-21 | End: 2023-04-04

## 2023-03-21 RX ADMIN — ESCITALOPRAM OXALATE 5 MILLIGRAM(S): 10 TABLET, FILM COATED ORAL at 08:21

## 2023-03-21 RX ADMIN — Medication 50 MICROGRAM(S): at 06:05

## 2023-03-21 RX ADMIN — ARIPIPRAZOLE 5 MILLIGRAM(S): 15 TABLET ORAL at 08:22

## 2023-03-21 NOTE — PHARMACOTHERAPY INTERVENTION NOTE - COMMENTS
Concurrent use of LEVOTHYROXINE, ANTACIDS (e.g., magnesium and aluminum), and SIMETHICONE may result in decreased levothyroxine effectiveness.    Added administration instruction to PRN order for aluminum hydroxide/magnesium hydroxide/simethicone suspension to "Do not administer within 4 hours of levothyroxine."    Shanthi Rodriguez PharmD, BCPP

## 2023-03-21 NOTE — BH INPATIENT PSYCHIATRY PROGRESS NOTE - NSBHASSESSSUMMFT_PSY_ALL_CORE
Patient is a 72 year old  retired  female currently residing in a private residence with  (daughter/son in law and 3 grandchildren live upstairs in a mother/daughter home). PPH MDD. Hx of 1 past inpatient admission 10-15 years ago (hospital unknown). Hx of 1 past suicide attempt 10-15 years ago via cutting- patient reports she required stitches. No history of aggression/violence, legal issues or substance use issues. PMH- Hypothyroidism. BIB  for depression.    Patient presents to the ED in the context of depression. Patient endorses stopping psychiatric medication and over the last 2 weeks has had onset of depressive symptoms. She has multiple psychosocial stressors- various family members are ill. Patient had been having passive suicidal ideation but yesterday engaged in SIB- cutting self with scissors with ambivalent suicidal intent. She continues to endorse depression, anhedonia and  is not at baseline. She is unpredictable and impulsive and is requesting and agreeing to voluntary inpatient admission.    3/13 Update: Transferring pt to the ED for head CT after pt was seen hitting her head against the radiator during the weekend. Pt appears dysphoric on encounter, reporting hx of chronic depression with recent worsening after stopping her Abilify, culminating on a suicide attempt cutting her wrists with a pair of scissors. Pt also endorsing long standing delusional jealousy. Daughter advocating for ECT treatment.     3/15 Update: Remains on CO for suicidality. Pt denies interval changes. Starting Abilify 5mg QD and Lexapro 5mg QD today. Pt interested in trying ECT. Consult placed. Hospitalist clearance pending. Pt given a copy of the ECT consent form.     3/16: Pt remans on CO for self-injurious behavior. Continues to present depressed mood, psychomotor retardation and hopelessness. Stays in her room most of the day, withdrawn and negativistic. ECT #1 scheduled for tomorrow. Covid swab pending.     3/17: Pt remains on CO for self injurious behavior. Pt has been compliant with her medications. Remains withdraws, stays in her room most of the day, often sitting by the edge of her bed, with psychomotor retardation. Continues to reports low mood, passive SI, anhedonia, lack of motivation and difficulty concentrating.  Presentation suggestive of melancholic depression. Refused ECT today, agreed to try again Monday morning.     3/18: depressed, withdrawn, isolated, minimally interactive.  3/19 remains isolated, preoccupied, depressed, compliant with meds, lab this am, agreed to go for ECT tomorrow.     3/20: Pt completed her 1st ECT today.  Pt presented with more reactive affect, with less thought blocking. Reports feeling better after ECT. Denies suicidal thoughts or thoughts of self harm at this time. If pt still denying SI tomorrow may d/c CO. Will continue ECT Wednesday and current med regimen.     3/21: Pt continues to report low mood and appears dysphoric during rounds today after limited improvement after ECT yesterday. Will increase Lexparo to 10mg QD and continue acute ECT course 3x/week. Will discontinue CO at this time as pt consistently denies SIIP and contracts for safety. ECT #2 tomorrow 3/22.    MDD with melancholic features  Cognitive impairment vs. Pseudo dementia symptoms related to MDD  Delusional Disorder? - chronic thoughts  is cheating on her  Continue ECT 3x/week  Start Lexapro 5mg QD (3/14), increase to 10mg QD (3/21)   Start Abilify 5mg QD (3/14), may consider Abilify Maintena to aid with compliance  Dispo: [facility]

## 2023-03-21 NOTE — BH INPATIENT PSYCHIATRY PROGRESS NOTE - NSBHFUPINTERVALHXFT_PSY_A_CORE
Patient seen for depression with psychosis. Chart and history reviewed and discussed with nursing team. Pt remains on CO for self injurious behavior. Pt appeared much improved yesterday after her first ECT. During encounter this morning she appears dysphoric again, continues to endorse low mood and presenting neurocognitive sx of depression. Pt doesn't recall feeling any better after ECT yesterday. This morning took her medications with encouragement from her RN. Denies suicidal ideation, intent or plan and contracts for safety in the unit, agrees to seek out staff if feeling overwhelmed. Will remove CO.   Called back  and left VM.

## 2023-03-21 NOTE — BH INPATIENT PSYCHIATRY PROGRESS NOTE - NSBHCHARTREVIEWVS_PSY_A_CORE FT
Vital Signs Last 24 Hrs  T(C): 36.7 (03-21-23 @ 08:02), Max: 36.7 (03-20-23 @ 11:53)  T(F): 98 (03-21-23 @ 08:02), Max: 98 (03-20-23 @ 11:53)  HR: 78 (03-20-23 @ 12:55) (74 - 94)  BP: 148/65 (03-20-23 @ 12:55) (114/63 - 170/86)  BP(mean): --  RR: 20 (03-20-23 @ 12:55) (16 - 20)  SpO2: 97% (03-21-23 @ 08:02) (95% - 100%)    Orthostatic VS  03-21-23 @ 08:02  Lying BP: --/-- HR: --  Sitting BP: 108/66 HR: 69  Standing BP: 113/61 HR: 78  Site: --  Mode: --  Orthostatic VS  03-20-23 @ 13:15  Lying BP: --/-- HR: --  Sitting BP: 141/63 HR: 73  Standing BP: 129/71 HR: 88  Site: --  Mode: --  Orthostatic VS  03-20-23 @ 06:19  Lying BP: 134/57 HR: 73  Sitting BP: 108/57 HR: 80  Standing BP: --/-- HR: --  Site: --  Mode: --

## 2023-03-22 PROCEDURE — 99232 SBSQ HOSP IP/OBS MODERATE 35: CPT | Mod: 25

## 2023-03-22 PROCEDURE — 90870 ELECTROCONVULSIVE THERAPY: CPT

## 2023-03-22 RX ORDER — ARIPIPRAZOLE 15 MG/1
7 TABLET ORAL DAILY
Refills: 0 | Status: DISCONTINUED | OUTPATIENT
Start: 2023-03-22 | End: 2023-03-23

## 2023-03-22 RX ADMIN — ESCITALOPRAM OXALATE 10 MILLIGRAM(S): 10 TABLET, FILM COATED ORAL at 13:37

## 2023-03-22 RX ADMIN — ARIPIPRAZOLE 7 MILLIGRAM(S): 15 TABLET ORAL at 13:37

## 2023-03-22 NOTE — BH INPATIENT PSYCHIATRY PROGRESS NOTE - NSBHFUPINTERVALHXFT_PSY_A_CORE
Patient seen for depression with psychosis. Chart and history reviewed and discussed with nursing team. Pt off CO and on routine checks. No behavioral events or PRNs needed overnight. During encounter this morning pt states she did not wish to continue ECT and expressed concerns about the potential side effects and not wanting people "messing with my brain". Writer encouraged pt to reconsider, taking into account she already had 1 ECT treatments to which she had no side effects and showed very promising response. Discussed with  who spoke who spoke to pt on the phone and convinced her to go. Pt completed second ECT today. Will continue titrating Abilify and Lexapro.

## 2023-03-22 NOTE — BH INPATIENT PSYCHIATRY PROGRESS NOTE - NSBHCHARTREVIEWVS_PSY_A_CORE FT
Vital Signs Last 24 Hrs  T(C): 36.2 (03-22-23 @ 11:50), Max: 37.2 (03-22-23 @ 06:37)  T(F): 97.2 (03-22-23 @ 11:50), Max: 98.9 (03-22-23 @ 06:37)  HR: 74 (03-22-23 @ 12:00) (68 - 77)  BP: 152/71 (03-22-23 @ 12:00) (136/61 - 160/61)  BP(mean): --  RR: 15 (03-22-23 @ 12:00) (15 - 18)  SpO2: 100% (03-22-23 @ 12:00) (96% - 100%)    Orthostatic VS  03-22-23 @ 05:06  Lying BP: 132/63 HR: 69  Sitting BP: 118/56 HR: 59  Standing BP: --/-- HR: --  Site: upper left arm  Mode: electronic  Orthostatic VS  03-21-23 @ 08:02  Lying BP: --/-- HR: --  Sitting BP: 108/66 HR: 69  Standing BP: 113/61 HR: 78  Site: --  Mode: --  Orthostatic VS  03-20-23 @ 13:15  Lying BP: --/-- HR: --  Sitting BP: 141/63 HR: 73  Standing BP: 129/71 HR: 88  Site: --  Mode: --

## 2023-03-22 NOTE — BH INPATIENT PSYCHIATRY PROGRESS NOTE - NSBHMETABOLIC_PSY_ALL_CORE_FT
BMI: BMI (kg/m2): 27.1 (03-13-23 @ 12:49)  HbA1c: A1C with Estimated Average Glucose Result: 5.4 % (03-13-23 @ 08:00)    Glucose: POCT Blood Glucose.: 101 mg/dL (03-11-23 @ 17:05)    BP: 152/71 (03-22-23 @ 12:00) (114/63 - 170/86)  Lipid Panel: Date/Time: 03-13-23 @ 08:00  Cholesterol, Serum: 158  Direct LDL: --  HDL Cholesterol, Serum: 66  Total Cholesterol/HDL Ration Measurement: --  Triglycerides, Serum: 55

## 2023-03-22 NOTE — BH INPATIENT PSYCHIATRY PROGRESS NOTE - NSBHASSESSSUMMFT_PSY_ALL_CORE
Patient is a 72 year old  retired  female currently residing in a private residence with  (daughter/son in law and 3 grandchildren live upstairs in a mother/daughter home). PPH MDD. Hx of 1 past inpatient admission 10-15 years ago (hospital unknown). Hx of 1 past suicide attempt 10-15 years ago via cutting- patient reports she required stitches. No history of aggression/violence, legal issues or substance use issues. PMH- Hypothyroidism. BIB  for depression.    Patient presents to the ED in the context of depression. Patient endorses stopping psychiatric medication and over the last 2 weeks has had onset of depressive symptoms. She has multiple psychosocial stressors- various family members are ill. Patient had been having passive suicidal ideation but yesterday engaged in SIB- cutting self with scissors with ambivalent suicidal intent. She continues to endorse depression, anhedonia and  is not at baseline. She is unpredictable and impulsive and is requesting and agreeing to voluntary inpatient admission.    3/13 Update: Transferring pt to the ED for head CT after pt was seen hitting her head against the radiator during the weekend. Pt appears dysphoric on encounter, reporting hx of chronic depression with recent worsening after stopping her Abilify, culminating on a suicide attempt cutting her wrists with a pair of scissors. Pt also endorsing long standing delusional jealousy. Daughter advocating for ECT treatment.     3/15 Update: Remains on CO for suicidality. Pt denies interval changes. Starting Abilify 5mg QD and Lexapro 5mg QD today. Pt interested in trying ECT. Consult placed. Hospitalist clearance pending. Pt given a copy of the ECT consent form.     3/16: Pt remans on CO for self-injurious behavior. Continues to present depressed mood, psychomotor retardation and hopelessness. Stays in her room most of the day, withdrawn and negativistic. ECT #1 scheduled for tomorrow. Covid swab pending.     3/17: Pt remains on CO for self injurious behavior. Pt has been compliant with her medications. Remains withdraws, stays in her room most of the day, often sitting by the edge of her bed, with psychomotor retardation. Continues to reports low mood, passive SI, anhedonia, lack of motivation and difficulty concentrating.  Presentation suggestive of melancholic depression. Refused ECT today, agreed to try again Monday morning.     3/18: depressed, withdrawn, isolated, minimally interactive.  3/19 remains isolated, preoccupied, depressed, compliant with meds, lab this am, agreed to go for ECT tomorrow.     3/20: Pt completed her 1st ECT today.  Pt presented with more reactive affect, with less thought blocking. Reports feeling better after ECT. Denies suicidal thoughts or thoughts of self harm at this time. If pt still denying SI tomorrow may d/c CO. Will continue ECT Wednesday and current med regimen.     3/21: Pt continues to report low mood and appears dysphoric during rounds today after limited improvement after ECT yesterday. Will increase Lexparo to 10mg QD and continue acute ECT course 3x/week. Will discontinue CO at this time as pt consistently denies SIIP and contracts for safety. ECT #2 tomorrow 3/22.    3/22: Pt completed ECT #2 today. Will continue titrating Lexapro and Abilify. Next ECT scheduled for Friday 3/24.    MDD with melancholic features  Cognitive impairment vs. Pseudo dementia symptoms related to MDD  Delusional Disorder? - chronic thoughts  is cheating on her  Continue ECT 3x/week  Start Lexapro 5mg QD (3/14), increase to 10mg QD (3/21)   Start Abilify 5mg QD (3/14), increased to 7mg on 3/22, may consider Abilify Maintena to aid with compliance  Dispo: [facility]

## 2023-03-23 PROCEDURE — 99232 SBSQ HOSP IP/OBS MODERATE 35: CPT

## 2023-03-23 RX ORDER — ARIPIPRAZOLE 15 MG/1
10 TABLET ORAL DAILY
Refills: 0 | Status: DISCONTINUED | OUTPATIENT
Start: 2023-03-24 | End: 2023-04-04

## 2023-03-23 RX ADMIN — Medication 50 MICROGRAM(S): at 06:33

## 2023-03-23 RX ADMIN — ARIPIPRAZOLE 7 MILLIGRAM(S): 15 TABLET ORAL at 08:56

## 2023-03-23 RX ADMIN — ESCITALOPRAM OXALATE 10 MILLIGRAM(S): 10 TABLET, FILM COATED ORAL at 08:56

## 2023-03-23 NOTE — BH INPATIENT PSYCHIATRY PROGRESS NOTE - NSBHFUPINTERVALHXFT_PSY_A_CORE
Patient seen for depression with psychosis. Chart and history reviewed and discussed with nursing team. Pt on routine checks. No behavioral events or PRNs needed overnight. During encounter this morning pt reports modest gains in terms of mood, reports feeling "better" but not fully back to her baseline. Presents less thought blocking, affect remains mostly constricted. Pt asking to stop ECT treatments at this time, stating "2 are enough and I already feel better". Encouraged her to complete a full course before stopping, warning that more treatments are often needed to achieve a sustained response. Will continue titrating Abilify and Lexapro.

## 2023-03-23 NOTE — BH INPATIENT PSYCHIATRY PROGRESS NOTE - NSBHASSESSSUMMFT_PSY_ALL_CORE
Patient is a 72 year old  retired  female currently residing in a private residence with  (daughter/son in law and 3 grandchildren live upstairs in a mother/daughter home). PPH MDD. Hx of 1 past inpatient admission 10-15 years ago (hospital unknown). Hx of 1 past suicide attempt 10-15 years ago via cutting- patient reports she required stitches. No history of aggression/violence, legal issues or substance use issues. PMH- Hypothyroidism. BIB  for depression.    Patient presents to the ED in the context of depression. Patient endorses stopping psychiatric medication and over the last 2 weeks has had onset of depressive symptoms. She has multiple psychosocial stressors- various family members are ill. Patient had been having passive suicidal ideation but yesterday engaged in SIB- cutting self with scissors with ambivalent suicidal intent. She continues to endorse depression, anhedonia and  is not at baseline. She is unpredictable and impulsive and is requesting and agreeing to voluntary inpatient admission.    3/13 Update: Transferring pt to the ED for head CT after pt was seen hitting her head against the radiator during the weekend. Pt appears dysphoric on encounter, reporting hx of chronic depression with recent worsening after stopping her Abilify, culminating on a suicide attempt cutting her wrists with a pair of scissors. Pt also endorsing long standing delusional jealousy. Daughter advocating for ECT treatment.     3/15 Update: Remains on CO for suicidality. Pt denies interval changes. Starting Abilify 5mg QD and Lexapro 5mg QD today. Pt interested in trying ECT. Consult placed. Hospitalist clearance pending. Pt given a copy of the ECT consent form.     3/16: Pt remans on CO for self-injurious behavior. Continues to present depressed mood, psychomotor retardation and hopelessness. Stays in her room most of the day, withdrawn and negativistic. ECT #1 scheduled for tomorrow. Covid swab pending.     3/17: Pt remains on CO for self injurious behavior. Pt has been compliant with her medications. Remains withdraws, stays in her room most of the day, often sitting by the edge of her bed, with psychomotor retardation. Continues to reports low mood, passive SI, anhedonia, lack of motivation and difficulty concentrating.  Presentation suggestive of melancholic depression. Refused ECT today, agreed to try again Monday morning.     3/18: depressed, withdrawn, isolated, minimally interactive.  3/19 remains isolated, preoccupied, depressed, compliant with meds, lab this am, agreed to go for ECT tomorrow.     3/20: Pt completed her 1st ECT today.  Pt presented with more reactive affect, with less thought blocking. Reports feeling better after ECT. Denies suicidal thoughts or thoughts of self harm at this time. If pt still denying SI tomorrow may d/c CO. Will continue ECT Wednesday and current med regimen.     3/21: Pt continues to report low mood and appears dysphoric during rounds today after limited improvement after ECT yesterday. Will increase Lexparo to 10mg QD and continue acute ECT course 3x/week. Will discontinue CO at this time as pt consistently denies SIIP and contracts for safety. ECT #2 tomorrow 3/22.    3/22: Pt completed ECT #2 today. Will continue titrating Lexapro and Abilify. Next ECT scheduled for Friday 3/24.    3/23: Pt reports modest gains in terms of mood. Still dysphoric. Denies SI. Will increase Abilify to 10mg QD.     MDD with melancholic features  Cognitive impairment vs. Pseudo dementia symptoms related to MDD  Delusional Disorder? - chronic thoughts  is cheating on her  Continue ECT 3x/week  Start Lexapro 5mg QD (3/14), increase to 10mg QD (3/21)   Start Abilify 5mg QD (3/14), increased to 7mg on 3/22, increased to 10mg for 3/24. May consider Abilify Maintena to aid with compliance  Dispo: [facility]

## 2023-03-23 NOTE — BH INPATIENT PSYCHIATRY PROGRESS NOTE - NSBHCHARTREVIEWVS_PSY_A_CORE FT
Vital Signs Last 24 Hrs  T(C): 36 (03-23-23 @ 05:20), Max: 36.5 (03-22-23 @ 15:38)  T(F): 96.8 (03-23-23 @ 05:20), Max: 97.7 (03-22-23 @ 15:38)  HR: --  BP: --  BP(mean): --  RR: 18 (03-23-23 @ 05:20) (18 - 18)  SpO2: 96% (03-23-23 @ 05:20) (96% - 96%)    Orthostatic VS  03-23-23 @ 05:20  Lying BP: 134/75 HR: 76  Sitting BP: 137/59 HR: 64  Standing BP: --/-- HR: --  Site: upper left arm  Mode: electronic  Orthostatic VS  03-22-23 @ 05:06  Lying BP: 132/63 HR: 69  Sitting BP: 118/56 HR: 59  Standing BP: --/-- HR: --  Site: upper left arm  Mode: electronic

## 2023-03-23 NOTE — BH INPATIENT PSYCHIATRY PROGRESS NOTE - NSBHMETABOLIC_PSY_ALL_CORE_FT
BMI: BMI (kg/m2): 27.1 (03-13-23 @ 12:49)  HbA1c: A1C with Estimated Average Glucose Result: 5.4 % (03-13-23 @ 08:00)    Glucose: POCT Blood Glucose.: 101 mg/dL (03-11-23 @ 17:05)    BP: 131/82 (03-22-23 @ 12:56) (131/82 - 160/61)  Lipid Panel: Date/Time: 03-13-23 @ 08:00  Cholesterol, Serum: 158  Direct LDL: --  HDL Cholesterol, Serum: 66  Total Cholesterol/HDL Ration Measurement: --  Triglycerides, Serum: 55

## 2023-03-24 PROCEDURE — 99232 SBSQ HOSP IP/OBS MODERATE 35: CPT | Mod: 25

## 2023-03-24 PROCEDURE — 90870 ELECTROCONVULSIVE THERAPY: CPT

## 2023-03-24 RX ADMIN — Medication 50 MICROGRAM(S): at 05:58

## 2023-03-24 RX ADMIN — ESCITALOPRAM OXALATE 10 MILLIGRAM(S): 10 TABLET, FILM COATED ORAL at 12:41

## 2023-03-24 RX ADMIN — ARIPIPRAZOLE 10 MILLIGRAM(S): 15 TABLET ORAL at 12:41

## 2023-03-24 NOTE — BH INPATIENT PSYCHIATRY PROGRESS NOTE - NSBHFUPINTERVALHXFT_PSY_A_CORE
Patient seen for depression with psychosis. Chart and history reviewed and discussed with nursing team. Pt on routine checks. No behavioral events or PRNs needed overnight.  Patient seen for depression with psychosis. Chart and history reviewed and discussed with nursing team. Pt on routine checks. No behavioral events or PRNs needed overnight. Pt reports low mood today, feeling unmotivated and not wanting to go to ECT. States "I felt so good after my last one, but now Im down again". Writer provided psychoeducation on how ECT works and advised she would need more treatments in order to achieve a sustained response. Pt spoke with her family and they convinced her to go to ECT today.

## 2023-03-24 NOTE — BH INPATIENT PSYCHIATRY PROGRESS NOTE - NSBHMETABOLIC_PSY_ALL_CORE_FT
BMI: BMI (kg/m2): 27.1 (03-13-23 @ 12:49)  HbA1c: A1C with Estimated Average Glucose Result: 5.4 % (03-13-23 @ 08:00)    Glucose: POCT Blood Glucose.: 101 mg/dL (03-11-23 @ 17:05)    BP: 130/99 (03-24-23 @ 12:15) (122/65 - 160/61)  Lipid Panel: Date/Time: 03-13-23 @ 08:00  Cholesterol, Serum: 158  Direct LDL: --  HDL Cholesterol, Serum: 66  Total Cholesterol/HDL Ration Measurement: --  Triglycerides, Serum: 55

## 2023-03-24 NOTE — BH INPATIENT PSYCHIATRY PROGRESS NOTE - NSBHCHARTREVIEWVS_PSY_A_CORE FT
Vital Signs Last 24 Hrs  T(C): 36.2 (03-24-23 @ 12:15), Max: 37 (03-23-23 @ 15:48)  T(F): 97.2 (03-24-23 @ 12:15), Max: 98.6 (03-23-23 @ 15:48)  HR: 68 (03-24-23 @ 12:15) (68 - 84)  BP: 130/99 (03-24-23 @ 12:15) (122/65 - 154/73)  BP(mean): --  RR: 15 (03-24-23 @ 12:15) (15 - 19)  SpO2: 96% (03-24-23 @ 12:15) (95% - 98%)    Orthostatic VS  03-24-23 @ 05:46  Lying BP: 117/79 HR: 66  Sitting BP: 124/64 HR: 69  Standing BP: --/-- HR: --  Site: --  Mode: --  Orthostatic VS  03-23-23 @ 05:20  Lying BP: 134/75 HR: 76  Sitting BP: 137/59 HR: 64  Standing BP: --/-- HR: --  Site: upper left arm  Mode: electronic

## 2023-03-24 NOTE — BH INPATIENT PSYCHIATRY PROGRESS NOTE - NSBHASSESSSUMMFT_PSY_ALL_CORE
Patient is a 72 year old  retired  female currently residing in a private residence with  (daughter/son in law and 3 grandchildren live upstairs in a mother/daughter home). PPH MDD. Hx of 1 past inpatient admission 10-15 years ago (hospital unknown). Hx of 1 past suicide attempt 10-15 years ago via cutting- patient reports she required stitches. No history of aggression/violence, legal issues or substance use issues. PMH- Hypothyroidism. BIB  for depression.    Patient presents to the ED in the context of depression. Patient endorses stopping psychiatric medication and over the last 2 weeks has had onset of depressive symptoms. She has multiple psychosocial stressors- various family members are ill. Patient had been having passive suicidal ideation but yesterday engaged in SIB- cutting self with scissors with ambivalent suicidal intent. She continues to endorse depression, anhedonia and  is not at baseline. She is unpredictable and impulsive and is requesting and agreeing to voluntary inpatient admission.    3/13 Update: Transferring pt to the ED for head CT after pt was seen hitting her head against the radiator during the weekend. Pt appears dysphoric on encounter, reporting hx of chronic depression with recent worsening after stopping her Abilify, culminating on a suicide attempt cutting her wrists with a pair of scissors. Pt also endorsing long standing delusional jealousy. Daughter advocating for ECT treatment.     3/15 Update: Remains on CO for suicidality. Pt denies interval changes. Starting Abilify 5mg QD and Lexapro 5mg QD today. Pt interested in trying ECT. Consult placed. Hospitalist clearance pending. Pt given a copy of the ECT consent form.     3/16: Pt remans on CO for self-injurious behavior. Continues to present depressed mood, psychomotor retardation and hopelessness. Stays in her room most of the day, withdrawn and negativistic. ECT #1 scheduled for tomorrow. Covid swab pending.     3/17: Pt remains on CO for self injurious behavior. Pt has been compliant with her medications. Remains withdraws, stays in her room most of the day, often sitting by the edge of her bed, with psychomotor retardation. Continues to reports low mood, passive SI, anhedonia, lack of motivation and difficulty concentrating.  Presentation suggestive of melancholic depression. Refused ECT today, agreed to try again Monday morning.     3/18: depressed, withdrawn, isolated, minimally interactive.  3/19 remains isolated, preoccupied, depressed, compliant with meds, lab this am, agreed to go for ECT tomorrow.     3/20: Pt completed her 1st ECT today.  Pt presented with more reactive affect, with less thought blocking. Reports feeling better after ECT. Denies suicidal thoughts or thoughts of self harm at this time. If pt still denying SI tomorrow may d/c CO. Will continue ECT Wednesday and current med regimen.     3/21: Pt continues to report low mood and appears dysphoric during rounds today after limited improvement after ECT yesterday. Will increase Lexparo to 10mg QD and continue acute ECT course 3x/week. Will discontinue CO at this time as pt consistently denies SIIP and contracts for safety. ECT #2 tomorrow 3/22.    3/22: Pt completed ECT #2 today. Will continue titrating Lexapro and Abilify. Next ECT scheduled for Friday 3/24.    3/23: Pt reports modest gains in terms of mood. Still dysphoric. Denies SI. Will increase Abilify to 10mg QD.     MDD with melancholic features  Cognitive impairment vs. Pseudo dementia symptoms related to MDD  Delusional Disorder? - chronic thoughts  is cheating on her  Continue ECT 3x/week  Start Lexapro 5mg QD (3/14), increase to 10mg QD (3/21)   Start Abilify 5mg QD (3/14), increased to 7mg on 3/22, increased to 10mg for 3/24. May consider Abilify Maintena to aid with compliance  [facility]po: [facility]     Patient is a 72 year old  retired  female currently residing in a private residence with  (daughter/son in law and 3 grandchildren live upstairs in a mother/daughter home). PPH MDD. Hx of 1 past inpatient admission 10-15 years ago (hospital unknown). Hx of 1 past suicide attempt 10-15 years ago via cutting- patient reports she required stitches. No history of aggression/violence, legal issues or substance use issues. PMH- Hypothyroidism. BIB  for depression.    Patient presents to the ED in the context of depression. Patient endorses stopping psychiatric medication and over the last 2 weeks has had onset of depressive symptoms. She has multiple psychosocial stressors- various family members are ill. Patient had been having passive suicidal ideation but yesterday engaged in SIB- cutting self with scissors with ambivalent suicidal intent. She continues to endorse depression, anhedonia and  is not at baseline. She is unpredictable and impulsive and is requesting and agreeing to voluntary inpatient admission.    3/13 Update: Transferring pt to the ED for head CT after pt was seen hitting her head against the radiator during the weekend. Pt appears dysphoric on encounter, reporting hx of chronic depression with recent worsening after stopping her Abilify, culminating on a suicide attempt cutting her wrists with a pair of scissors. Pt also endorsing long standing delusional jealousy. Daughter advocating for ECT treatment.     3/15 Update: Remains on CO for suicidality. Pt denies interval changes. Starting Abilify 5mg QD and Lexapro 5mg QD today. Pt interested in trying ECT. Consult placed. Hospitalist clearance pending. Pt given a copy of the ECT consent form.     3/16: Pt remans on CO for self-injurious behavior. Continues to present depressed mood, psychomotor retardation and hopelessness. Stays in her room most of the day, withdrawn and negativistic. ECT #1 scheduled for tomorrow. Covid swab pending.     3/17: Pt remains on CO for self injurious behavior. Pt has been compliant with her medications. Remains withdraws, stays in her room most of the day, often sitting by the edge of her bed, with psychomotor retardation. Continues to reports low mood, passive SI, anhedonia, lack of motivation and difficulty concentrating.  Presentation suggestive of melancholic depression. Refused ECT today, agreed to try again Monday morning.     3/18: depressed, withdrawn, isolated, minimally interactive.  3/19 remains isolated, preoccupied, depressed, compliant with meds, lab this am, agreed to go for ECT tomorrow.     3/20: Pt completed her 1st ECT today.  Pt presented with more reactive affect, with less thought blocking. Reports feeling better after ECT. Denies suicidal thoughts or thoughts of self harm at this time. If pt still denying SI tomorrow may d/c CO. Will continue ECT Wednesday and current med regimen.     3/21: Pt continues to report low mood and appears dysphoric during rounds today after limited improvement after ECT yesterday. Will increase Lexparo to 10mg QD and continue acute ECT course 3x/week. Will discontinue CO at this time as pt consistently denies SIIP and contracts for safety. ECT #2 tomorrow 3/22.    3/22: Pt completed ECT #2 today. Will continue titrating Lexapro and Abilify. Next ECT scheduled for Friday 3/24.    3/23: Pt reports modest gains in terms of mood. Still dysphoric. Denies SI. Will increase Abilify to 10mg QD.     3/24: Pt completed ECT #3 today, next ECT scheduled for Monday 3/27. Still dysphoric, minor gains.     MDD with melancholic features  Cognitive impairment vs. Pseudo dementia symptoms related to MDD  Delusional Disorder? - chronic thoughts  is cheating on her  Continue ECT 3x/week  Start Lexapro 5mg QD (3/14), increase to 10mg QD (3/21)   Start Abilify 5mg QD (3/14), increased to 7mg on 3/22, increased to 10mg for 3/24. May consider Abilify Maintena to aid with compliance  Dispo: [facility]

## 2023-03-25 RX ORDER — ACETAMINOPHEN 500 MG
650 TABLET ORAL EVERY 6 HOURS
Refills: 0 | Status: DISCONTINUED | OUTPATIENT
Start: 2023-03-25 | End: 2023-04-04

## 2023-03-25 RX ADMIN — Medication 50 MICROGRAM(S): at 06:32

## 2023-03-25 RX ADMIN — Medication 650 MILLIGRAM(S): at 06:45

## 2023-03-25 RX ADMIN — ARIPIPRAZOLE 10 MILLIGRAM(S): 15 TABLET ORAL at 08:20

## 2023-03-25 RX ADMIN — ESCITALOPRAM OXALATE 10 MILLIGRAM(S): 10 TABLET, FILM COATED ORAL at 08:20

## 2023-03-26 LAB — SARS-COV-2 RNA SPEC QL NAA+PROBE: SIGNIFICANT CHANGE UP

## 2023-03-26 RX ADMIN — Medication 50 MICROGRAM(S): at 06:50

## 2023-03-26 RX ADMIN — ARIPIPRAZOLE 10 MILLIGRAM(S): 15 TABLET ORAL at 09:07

## 2023-03-26 RX ADMIN — ESCITALOPRAM OXALATE 10 MILLIGRAM(S): 10 TABLET, FILM COATED ORAL at 09:07

## 2023-03-27 PROBLEM — Z78.9 OTHER SPECIFIED HEALTH STATUS: Chronic | Status: ACTIVE | Noted: 2023-03-13

## 2023-03-27 PROCEDURE — 90870 ELECTROCONVULSIVE THERAPY: CPT

## 2023-03-27 PROCEDURE — 90832 PSYTX W PT 30 MINUTES: CPT

## 2023-03-27 PROCEDURE — 99232 SBSQ HOSP IP/OBS MODERATE 35: CPT | Mod: 25

## 2023-03-27 RX ADMIN — ESCITALOPRAM OXALATE 10 MILLIGRAM(S): 10 TABLET, FILM COATED ORAL at 12:53

## 2023-03-27 RX ADMIN — ARIPIPRAZOLE 10 MILLIGRAM(S): 15 TABLET ORAL at 12:52

## 2023-03-27 NOTE — BH INPATIENT PSYCHIATRY PROGRESS NOTE - NSBHASSESSSUMMFT_PSY_ALL_CORE
Patient is a 72 year old  retired  female currently residing in a private residence with  (daughter/son in law and 3 grandchildren live upstairs in a mother/daughter home). PPH MDD. Hx of 1 past inpatient admission 10-15 years ago (hospital unknown). Hx of 1 past suicide attempt 10-15 years ago via cutting- patient reports she required stitches. No history of aggression/violence, legal issues or substance use issues. PMH- Hypothyroidism. BIB  for depression.    Patient presents to the ED in the context of depression. Patient endorses stopping psychiatric medication and over the last 2 weeks has had onset of depressive symptoms. She has multiple psychosocial stressors- various family members are ill. Patient had been having passive suicidal ideation but yesterday engaged in SIB- cutting self with scissors with ambivalent suicidal intent. She continues to endorse depression, anhedonia and  is not at baseline. She is unpredictable and impulsive and is requesting and agreeing to voluntary inpatient admission.    3/13 Update: Transferring pt to the ED for head CT after pt was seen hitting her head against the radiator during the weekend. Pt appears dysphoric on encounter, reporting hx of chronic depression with recent worsening after stopping her Abilify, culminating on a suicide attempt cutting her wrists with a pair of scissors. Pt also endorsing long standing delusional jealousy. Daughter advocating for ECT treatment.     3/15 Update: Remains on CO for suicidality. Pt denies interval changes. Starting Abilify 5mg QD and Lexapro 5mg QD today. Pt interested in trying ECT. Consult placed. Hospitalist clearance pending. Pt given a copy of the ECT consent form.     3/16: Pt remans on CO for self-injurious behavior. Continues to present depressed mood, psychomotor retardation and hopelessness. Stays in her room most of the day, withdrawn and negativistic. ECT #1 scheduled for tomorrow. Covid swab pending.     3/17: Pt remains on CO for self injurious behavior. Pt has been compliant with her medications. Remains withdraws, stays in her room most of the day, often sitting by the edge of her bed, with psychomotor retardation. Continues to reports low mood, passive SI, anhedonia, lack of motivation and difficulty concentrating.  Presentation suggestive of melancholic depression. Refused ECT today, agreed to try again Monday morning.     3/18: depressed, withdrawn, isolated, minimally interactive.  3/19 remains isolated, preoccupied, depressed, compliant with meds, lab this am, agreed to go for ECT tomorrow.     3/20: Pt completed her 1st ECT today.  Pt presented with more reactive affect, with less thought blocking. Reports feeling better after ECT. Denies suicidal thoughts or thoughts of self harm at this time. If pt still denying SI tomorrow may d/c CO. Will continue ECT Wednesday and current med regimen.     3/21: Pt continues to report low mood and appears dysphoric during rounds today after limited improvement after ECT yesterday. Will increase Lexparo to 10mg QD and continue acute ECT course 3x/week. Will discontinue CO at this time as pt consistently denies SIIP and contracts for safety. ECT #2 tomorrow 3/22.    3/22: Pt completed ECT #2 today. Will continue titrating Lexapro and Abilify. Next ECT scheduled for Friday 3/24.    3/23: Pt reports modest gains in terms of mood. Still dysphoric. Denies SI. Will increase Abilify to 10mg QD.     3/24: Pt completed ECT #3 today, next ECT scheduled for Monday 3/27. Still dysphoric, minor gains.     3/27: Pt appears brighter today, reports gains in terms of mood. S/P ECT #4 today.     MDD with melancholic features  Cognitive impairment vs. Pseudo dementia symptoms related to MDD  Delusional Disorder? - chronic thoughts  is cheating on her  Continue ECT 3x/week  Start Lexapro 5mg QD (3/14), increase to 10mg QD (3/21)   Start Abilify 5mg QD (3/14), increased to 7mg on 3/22, increased to 10mg for 3/24. May consider Abilify Maintena to aid with compliance  Dispo: [facility]

## 2023-03-27 NOTE — BH PSYCHOLOGY - CLINICIAN PSYCHOTHERAPY NOTE - TOKEN PULL-DIAGNOSIS
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   

## 2023-03-27 NOTE — BH PSYCHOLOGY - CLINICIAN PSYCHOTHERAPY NOTE - NSBHPSYCHOLNARRATIVE_PSY_A_CORE FT
Psychologist met with the patient for an individual therapy session at the request of the treatment team and consent of the patient. Patient discussed issues related to their hospitalization. Patient processed emotions associated with upcoming ECT, discussing her hopes for discharge, and improvements she’s experienced since her admission. Patient responded well to empathy and validation of their emotional experience. Psychologist continued our discussion from the previous session regarding behavior activation, activity scheduling, and coping. Patient reviewed menu of pleasurable activities and agreed to engage in at least one of the activities for review during next session. Patient welcomed psychology f/u and thanked psychologist for session.    Appearance: Patient appeared their stated age, appropriately attired, well dressed.    Cognition and sensorium: Patient appeared awake and alert. Memory appeared sufficient for purposes of this interview.   Behavior: Patient generally appeared calm and cooperative. Eye contact was good and appropriate  Motor: Mild fidgeting noted, especially when discussing emotionally evocative content   Gait: N/A patient was seen seated in bed for entire session.  Speech:  Rate of expressive speech was normal. Volume was low and tone was soft. Quantity was responsive   Mood: Described as “better,” rating it at “9/10” (10 = best), an improvement from previous session (“3/10”).   Affect: Appeared initially appeared anxious and slightly euthymic, broader in range compared to previous session. Congruent with reported thought/mood.  Thought process (observed):  Less negativistic, less focus on negative beliefs.   Thought Content: Patient did not endorse Auditory Hallucinations or Visual Hallucinations.  Pt did not endorse homicidal ideation, intent, plan. Patient denied suicidal ideation, intent, plan, or urge to self-harm.  Attention/Concentration: good  Impulse control: good.  Insight:  fair– patient appears to have some insight into their symptoms, though may not be aware of the extent on their impact on her life  Judgment: good as evidenced by their desire to engage in treatment today  
Psychologist met with the patient for an individual therapy session at the request of the treatment team and consent of the patient. Patient discussed issues related to their hospitalization. Patient processed emotions associated with upcoming ECT. Patient responded well to empathy and validation of their emotional experience. Patient reported not reviewing handouts or tracking her emotions. Psychologist engaged in motivational interviewing to help address ambivalence. Psychologist provided psychoeducation on behavior activation, activity scheduling, and coping. At end of session, patient reported her mood improved and stated she felt “more hopeful.” Patient welcomed psychology f/u and thanked psychologist for session.    Appearance: Patient appeared their stated age, appropriately attired, well dressed.    Cognition and sensorium: Patient appeared awake and alert. Memory appeared sufficient for purposes of this interview.   Behavior: Patient generally appeared calm and cooperative. Eye contact was good and appropriate  Motor: Mild fidgeting noted, especially when discussing emotionally evocative content   Gait: N/A patient was seen seated in bed for entire session.  Speech:  Rate of expressive speech was normal. Volume was low and tone was soft. Quantity was responsive   Mood: Described as “so-so,” rating it at “4/10” (10 = best) at outset, an improvement from previous session (“3/10”). At end of session, she reported her mood had improved (“8/10”).  Affect: Appeared initially appeared anxious and dysthymic, narrow in range. Congruent with reported thought/mood.  Thought process (observed):  Negativistic, tending to perseverate on negative beliefs.   Thought Content: Patient did not endorse Auditory Hallucinations or Visual Hallucinations.  Pt did not endorse homicidal ideation, intent, plan. Patient denied suicidal ideation, intent, plan, or urge to self-harm.  Attention/Concentration: good  Impulse control: good.  Insight:  fair– patient appears to have some insight into their symptoms, though may not be aware of the extent on their impact on her life  Judgment: good as evidenced by their desire to engage in treatment today  
Psychologist met with the patient for an individual therapy session at the request of the treatment team and consent of the patient. Patient discussed issues related to their hospitalization. Themes in session included concerns with depression, interpersonal challenges, and shame/guilt. Patient responded well to empathy and validation of their emotional experience. While the patient had some difficulty identifying specific difficulties, positives in her life, and values- with some scaffolding, she was able to identify and discuss. Psychologist provided psychoeducation on behavior activation and self-compassion. Psychologist also provided handouts on mood tracking, distress tolerance, and coping skills, for her to review at next session. When asked, she denied current suicidal ideation, intent, plan, or urge to self-harm. She reported feeling safe on the unit. Patient engaged in negative thought challenging intervention, which she found helpful. At end of session, patient reported her mood improved. Patient welcomed psychology f/u and thanked psychologist for session.    In order to better understand her symptoms, this Psychologist administered the following symptom inventories: Geriatric Anxiety Scale -10 item form (GAS-10), and the Geriatric Depression Scale -Short Form (GDS-SF), which have been demonstrated to be valid assessments for adults and older adults. Results from these measures indicate that the patient is experiencing a moderate level of depression (GDS-SF = 11/15) and a moderate level of anxiety (GAS-10 = 10/30). The patient reported that results from these measures were less severe than her subjective experience; which she reported being in the high range for both depression and anxiety.      Appearance: Patient appeared their stated age, appropriately attired, adequately dressed.    Cognition and sensorium: Patient appeared awake and alert. Memory appeared sufficient for purposes of this interview.   Behavior: Patient generally appeared calm and cooperative. Some guardedness at outset. Her interaction style appeared self-deprecating with many negativistic statements. Eye contact was good and appropriate  Motor: Mild fidgeting noted, especially when discussing emotionally evocative content   Gait: N/A patient was seen seated in bed for entire session.  Speech:  Rate of expressive speech was normal. Volume was low and tone was soft. Quantity was responsive   Mood: Described as “fine,” rating it at “3/10” (10 = best) at outset. At end of session, she reported her mood had improved (“5/10”).  Affect: Appeared anxious and dysthymic, narrow in range. Congruent with reported thought/mood.  Thought process (observed):  Negativistic, tending to perseverate on negative beliefs.   Thought Content: Patient did not endorse Auditory Hallucinations or Visual Hallucinations.  Pt did not endorse homicidal ideation, intent, plan. Patient denied suicidal ideation, intent, plan, or urge to self-harm.  Attention/Concentration: good  Impulse control: good.  Insight:  fair– patient appears to have some insight into their symptoms, though may not be aware of the extent on their impact on her life  Judgment: good as evidenced by their desire to engage in treatment today

## 2023-03-27 NOTE — BH INPATIENT PSYCHIATRY PROGRESS NOTE - NSBHMETABOLIC_PSY_ALL_CORE_FT
BMI: BMI (kg/m2): 27.1 (03-13-23 @ 12:49)  HbA1c: A1C with Estimated Average Glucose Result: 5.4 % (03-13-23 @ 08:00)    Glucose: POCT Blood Glucose.: 101 mg/dL (03-11-23 @ 17:05)    BP: 145/64 (03-27-23 @ 12:30) (129/81 - 188/87)  Lipid Panel: Date/Time: 03-13-23 @ 08:00  Cholesterol, Serum: 158  Direct LDL: --  HDL Cholesterol, Serum: 66  Total Cholesterol/HDL Ration Measurement: --  Triglycerides, Serum: 55

## 2023-03-27 NOTE — BH PSYCHOLOGY - CLINICIAN PSYCHOTHERAPY NOTE - NSBHPSYCHOLINT_PSY_A_CORE
Cognitive/behavioral therapy/Stress management/Supported coping skills/Supportive therapy
Cognitive/behavioral therapy/Stress management/Supported coping skills/Supportive therapy
Cognitive/behavioral therapy/Supported coping skills/Supportive therapy

## 2023-03-27 NOTE — BH INPATIENT PSYCHIATRY PROGRESS NOTE - NSBHFUPINTERVALHXFT_PSY_A_CORE
Patient seen for depression with psychosis. Chart and history reviewed and discussed with nursing team. Pt on routine checks. No behavioral events or PRNs needed overnight. Pt reports feeling better in terms of mood and more motivated today. Pt no longer refusing ECT and states she can now notice the improvement and is on board with continuing treatment. Denies SIIP. No self harming behavior reported. More visible in the unit.

## 2023-03-27 NOTE — BH PSYCHOLOGY - CLINICIAN PSYCHOTHERAPY NOTE - NSTXSUICIDGOAL_PSY_ALL_CORE
Will verbalize a decrease in preoccupation with suicidal thoughts and / or intent to commit suicide to 2 on a 10-point scale
Talk to staff and ask for assistance when having suicidal wishes instead of acting out
Talk to staff and ask for assistance when having suicidal wishes instead of acting out

## 2023-03-27 NOTE — DIETITIAN INITIAL EVALUATION ADULT - OTHER INFO
Pt is a 71 y/o female with PPHx of MDD. Pertinent medical history of Hypothyroidism. Pt BIB  for depression. Admitted to [facility]. Primary diagnosis of Severe recurrent depression with Psychosis. Saw Pt in her room. Reports good appetite/po intake. Complains of "some constipation". Encouraged fiber and fluid intake. Explored food preferences, will implement them. NKFA.  UBW: 165 lbs

## 2023-03-27 NOTE — DIETITIAN INITIAL EVALUATION ADULT - PERTINENT MEDS FT
MEDICATIONS  (STANDING):  ARIPiprazole 10 milliGRAM(s) Oral daily  escitalopram 10 milliGRAM(s) Oral daily  levothyroxine 50 MICROGram(s) Oral daily

## 2023-03-27 NOTE — BH INPATIENT PSYCHIATRY PROGRESS NOTE - NSBHCHARTREVIEWVS_PSY_A_CORE FT
Vital Signs Last 24 Hrs  T(C): 36.8 (03-27-23 @ 12:30), Max: 36.8 (03-27-23 @ 11:17)  T(F): 98.3 (03-27-23 @ 12:30), Max: 98.3 (03-27-23 @ 12:15)  HR: 74 (03-27-23 @ 12:30) (74 - 94)  BP: 145/64 (03-27-23 @ 12:30) (129/81 - 188/87)  BP(mean): --  RR: 18 (03-27-23 @ 12:30) (16 - 21)  SpO2: 99% (03-27-23 @ 12:30) (96% - 100%)    Orthostatic VS  03-27-23 @ 06:07  Lying BP: 136/63 HR: 57  Sitting BP: 153/60 HR: 71  Standing BP: --/-- HR: --  Site: --  Mode: --  Orthostatic VS  03-26-23 @ 08:08  Lying BP: --/-- HR: --  Sitting BP: 136/70 HR: 81  Standing BP: 125/76 HR: 90  Site: upper left arm  Mode: electronic

## 2023-03-28 PROCEDURE — 99232 SBSQ HOSP IP/OBS MODERATE 35: CPT

## 2023-03-28 RX ADMIN — ARIPIPRAZOLE 10 MILLIGRAM(S): 15 TABLET ORAL at 08:26

## 2023-03-28 RX ADMIN — ESCITALOPRAM OXALATE 10 MILLIGRAM(S): 10 TABLET, FILM COATED ORAL at 08:26

## 2023-03-28 RX ADMIN — Medication 50 MICROGRAM(S): at 06:23

## 2023-03-28 NOTE — BH INPATIENT PSYCHIATRY PROGRESS NOTE - NSBHCHARTREVIEWVS_PSY_A_CORE FT
Vital Signs Last 24 Hrs  T(C): 35.9 (03-28-23 @ 05:12), Max: 37.1 (03-27-23 @ 15:59)  T(F): 96.7 (03-28-23 @ 05:12), Max: 98.7 (03-27-23 @ 15:59)  HR: --  BP: --  BP(mean): --  RR: 18 (03-28-23 @ 05:12) (18 - 18)  SpO2: 100% (03-28-23 @ 05:12) (96% - 100%)    Orthostatic VS  03-28-23 @ 05:12  Lying BP: 121/59 HR: 72  Sitting BP: 131/56 HR: 71  Standing BP: --/-- HR: --  Site: upper left arm  Mode: electronic  Orthostatic VS  03-27-23 @ 06:07  Lying BP: 136/63 HR: 57  Sitting BP: 153/60 HR: 71  Standing BP: --/-- HR: --  Site: --  Mode: --

## 2023-03-28 NOTE — BH INPATIENT PSYCHIATRY PROGRESS NOTE - NSBHASSESSSUMMFT_PSY_ALL_CORE
Patient is a 72 year old  retired  female currently residing in a private residence with  (daughter/son in law and 3 grandchildren live upstairs in a mother/daughter home). PPH MDD. Hx of 1 past inpatient admission 10-15 years ago (hospital unknown). Hx of 1 past suicide attempt 10-15 years ago via cutting- patient reports she required stitches. No history of aggression/violence, legal issues or substance use issues. PMH- Hypothyroidism. BIB  for depression.    Patient presents to the ED in the context of depression. Patient endorses stopping psychiatric medication and over the last 2 weeks has had onset of depressive symptoms. She has multiple psychosocial stressors- various family members are ill. Patient had been having passive suicidal ideation but yesterday engaged in SIB- cutting self with scissors with ambivalent suicidal intent. She continues to endorse depression, anhedonia and  is not at baseline. She is unpredictable and impulsive and is requesting and agreeing to voluntary inpatient admission.    3/13 Update: Transferring pt to the ED for head CT after pt was seen hitting her head against the radiator during the weekend. Pt appears dysphoric on encounter, reporting hx of chronic depression with recent worsening after stopping her Abilify, culminating on a suicide attempt cutting her wrists with a pair of scissors. Pt also endorsing long standing delusional jealousy. Daughter advocating for ECT treatment.     3/15 Update: Remains on CO for suicidality. Pt denies interval changes. Starting Abilify 5mg QD and Lexapro 5mg QD today. Pt interested in trying ECT. Consult placed. Hospitalist clearance pending. Pt given a copy of the ECT consent form.     3/16: Pt remans on CO for self-injurious behavior. Continues to present depressed mood, psychomotor retardation and hopelessness. Stays in her room most of the day, withdrawn and negativistic. ECT #1 scheduled for tomorrow. Covid swab pending.     3/17: Pt remains on CO for self injurious behavior. Pt has been compliant with her medications. Remains withdraws, stays in her room most of the day, often sitting by the edge of her bed, with psychomotor retardation. Continues to reports low mood, passive SI, anhedonia, lack of motivation and difficulty concentrating.  Presentation suggestive of melancholic depression. Refused ECT today, agreed to try again Monday morning.     3/18: depressed, withdrawn, isolated, minimally interactive.  3/19 remains isolated, preoccupied, depressed, compliant with meds, lab this am, agreed to go for ECT tomorrow.     3/20: Pt completed her 1st ECT today.  Pt presented with more reactive affect, with less thought blocking. Reports feeling better after ECT. Denies suicidal thoughts or thoughts of self harm at this time. If pt still denying SI tomorrow may d/c CO. Will continue ECT Wednesday and current med regimen.     3/21: Pt continues to report low mood and appears dysphoric during rounds today after limited improvement after ECT yesterday. Will increase Lexparo to 10mg QD and continue acute ECT course 3x/week. Will discontinue CO at this time as pt consistently denies SIIP and contracts for safety. ECT #2 tomorrow 3/22.    3/22: Pt completed ECT #2 today. Will continue titrating Lexapro and Abilify. Next ECT scheduled for Friday 3/24.    3/23: Pt reports modest gains in terms of mood. Still dysphoric. Denies SI. Will increase Abilify to 10mg QD.     3/24: Pt completed ECT #3 today, next ECT scheduled for Monday 3/27. Still dysphoric, minor gains.     3/27: Pt appears brighter today, reports gains in terms of mood. S/P ECT #4 today.     3/28: Pt presenting sustained gains in terms of mood. ECT #5 tomorrow.     MDD with melancholic features  Cognitive impairment vs. Pseudo dementia symptoms related to MDD  Delusional Disorder? - chronic thoughts  is cheating on her  Continue ECT 3x/week  Start Lexapro 5mg QD (3/14), increase to 10mg QD (3/21)   Start Abilify 5mg QD (3/14), increased to 7mg on 3/22, increased to 10mg for 3/24. May consider Abilify Maintena to aid with compliance  Dispo: [facility]

## 2023-03-28 NOTE — BH INPATIENT PSYCHIATRY PROGRESS NOTE - NSBHMETABOLIC_PSY_ALL_CORE_FT
BMI: BMI (kg/m2): 27.1 (03-13-23 @ 12:49)  HbA1c: A1C with Estimated Average Glucose Result: 5.4 % (03-13-23 @ 08:00)    Glucose: POCT Blood Glucose.: 101 mg/dL (03-11-23 @ 17:05)    BP: 158/71 (03-27-23 @ 12:53) (129/81 - 188/87)  Lipid Panel: Date/Time: 03-13-23 @ 08:00  Cholesterol, Serum: 158  Direct LDL: --  HDL Cholesterol, Serum: 66  Total Cholesterol/HDL Ration Measurement: --  Triglycerides, Serum: 55

## 2023-03-28 NOTE — BH INPATIENT PSYCHIATRY PROGRESS NOTE - NSBHFUPINTERVALHXFT_PSY_A_CORE
Patient seen for depression with psychosis. Chart and history reviewed and discussed with nursing team. No behavioral events or PRNs needed. Pt reporting sustained gains in terms of mood. Reports feeling "better", more motivated, with more energy and denies suicidal thoughts. Pt is more visible in the unit. She has also stopped endorsing beliefs  is having an affair at this time. Spoke with patient's family, they agree pt is better but believe not fully back at baseline. Will continue ECT Wed and Friday. Possible discharge next week on Tuesday.

## 2023-03-29 PROCEDURE — 90870 ELECTROCONVULSIVE THERAPY: CPT

## 2023-03-29 RX ADMIN — Medication 50 MICROGRAM(S): at 06:09

## 2023-03-29 RX ADMIN — ESCITALOPRAM OXALATE 10 MILLIGRAM(S): 10 TABLET, FILM COATED ORAL at 09:06

## 2023-03-29 RX ADMIN — ARIPIPRAZOLE 10 MILLIGRAM(S): 15 TABLET ORAL at 09:06

## 2023-03-29 NOTE — BH INPATIENT PSYCHIATRY DISCHARGE NOTE - NSDCMRMEDTOKEN_GEN_ALL_CORE_FT
Synthroid 50 mcg (0.05 mg) oral tablet: 1 tab(s) orally once a day   Abilify 10 mg oral tablet: 1 tab(s) orally once a day  escitalopram 10 mg oral tablet: 1 tab(s) orally once a day  Synthroid 50 mcg (0.05 mg) oral tablet: 1 tab(s) orally once a day

## 2023-03-29 NOTE — BH INPATIENT PSYCHIATRY DISCHARGE NOTE - ATTENDING DISCHARGE PHYSICAL EXAMINATION:
Appearance: casually groomed, fair hygiene; stable gait; no abnormal involuntary movements noted  Behavior: calm, cooperative, well engaged, attentive  Speech: wnl  Mood: denies depressed mood  Affect: neutral, stable, reactive  Thought process: linear, logical  Thought content: no delusions elicited; denies SI  Perceptual disturbances: no appearance of internal preoccupation  Orientation: oriented to all spheres  Abstraction: fair  Fund of knowledge: adequate  Insight: fair  Judgement: intact
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
## 2023-03-29 NOTE — BH INPATIENT PSYCHIATRY DISCHARGE NOTE - NSBHDCRISKMITIGATEFT_PSY_ALL_CORE
Patient has chronic risk due to h/o suicide attempts. Acute risk significantly reduced following treatment that has provided full remission of depressive symptoms. Patient has supportive family and committed to outpatient care. No substance abuse involved. Stable domicile. Patient has had sustained absence of suicidal thoughts, psychosis and now no longer meets criteria for acute inpatient care.

## 2023-03-29 NOTE — BH INPATIENT PSYCHIATRY DISCHARGE NOTE - NSBHDCHANDOFFFT_PSY_ALL_CORE
Case summary sent via email to Dr. Cruz and personal phone number provided in case of need for further clarification.

## 2023-03-29 NOTE — BH INPATIENT PSYCHIATRY DISCHARGE NOTE - NSDCCPCAREPLAN_GEN_ALL_CORE_FT
PRINCIPAL DISCHARGE DIAGNOSIS  Diagnosis: Severe recurrent major depression  Assessment and Plan of Treatment:

## 2023-03-29 NOTE — BH INPATIENT PSYCHIATRY DISCHARGE NOTE - OTHER PAST PSYCHIATRIC HISTORY (INCLUDE DETAILS REGARDING ONSET, COURSE OF ILLNESS, INPATIENT/OUTPATIENT TREATMENT)
72-year-old  retired  female currently residing in a private residence with  (daughter/son in law and 3 grandchildren live upstairs in a mother/daughter home). PPH MDD. Hx of 1 past inpatient admission 10-15 years ago (hospital unknown). Hx of 1 past suicide attempt 10-15 years ago via cutting- patient reports she required stitches. No history of aggression/violence, legal issues or substance use issues. PMH- Hypothyroidism. BIB  for depression.     As per admission, Patient reports she came to the ED because she just restarted her psychiatric medication and has not been feeling like she once did. she reports she stopped her medication because her MD told her she couldn't stay on it long term. However, over the last few weeks she started feeling depressed. Patient was vague and ambivalent regarding symptoms. She endorsed feeling sad, not going to the gym or going on walks with friends like she once did and doesn't find enjoyment in things. She stated 2 days ago she started having thoughts "I can't deal with this anymore," ie: her mental health issue. When questioned regarding thoughts to die/not be alive she stated, "I guess." She denied active SI/plan/intent. However, she admits to cutting self with a scissor yesterday on both her wrists but was unsure of her intent stating "I wasn't in my right mind. I just couldn't take it." When questioned if she was trying to kill herself, she was unsure and stated, "I guess." She was unable to name any reasons for NSSIB. She denied current SI/plan/intent.     On admission, she endorsed beliefs that her  was cheating on her with the woman across the street. She said she has believed this x years and referred to a party 2 years ago when her daughter called the neighbor her 's GF. She stated, "my family says I'm delusional about it." She stated she is showering and changing her clothes. Patient was then tearful stating her life is miserable and has been horrible.     Today, pt is evasive keeps saying that she can’t remember things. Sates that she is “overwhelmed” with her situation at home but does not elaborate. Does not talk about her  cheating on her. She reports that her mood is “very bad”, btu denies current SI, stating that she wants to “fix things at home”. She states that she wants to get better. She states shi she has been depressed “ a long time”, and that the medications don’t really help her. States that Abilify caused her to have severe migraines frequently.     Pt understands the nature of ECT procedure, states that she is scared but wants to get better. She said she would like to sign the consent, but would want to talk more with her . Pt is aware of the voluntary nature of the procedure.   PPH MDD. Hx of 1 past inpatient admission 10-15 years ago (hospital unknown). Hx of 1 past suicide attempt 10-15 years ago via cutting- patient reports she required stitches. No current outpatient treater. Patient reported chronically delusional over last few years    No history of bipolar disorder symptoms or psychosis

## 2023-03-29 NOTE — BH INPATIENT PSYCHIATRY DISCHARGE NOTE - HOSPITAL COURSE
Ms. Briceno is a 72 year old woman with past hx of depression, 2 prior suicide attempts (First one in 2003 via cutting, second one also via cutting which led to this hospitalization) and Hypothyroidism. Pt currently admitted for treatment of depression and suspected delusions in the context of treatment non-adherence over the past year. On admission pt presented sx suggestive of major depressive disorder with melancholic features and possible delusional thinking. Pt presented as dysphoric, with blunted affect, psychomotor retardation, thought blocking, poverty of thought, difficulty concentrating, endorsing anhedonia, passive suicidal ideation, hopelessness, low energy, poor sleep and lack of motivation. Pt also endorsed being convinced her  was having an affair. Of note  did have an affair in the past, however as per family this is not ongoing and  now has significant health comorbidities. On her first weekend pt had to be pout on CO for self injurious behavior, she was banging her head against the radiator in her room. Pt was sent to ED for head CT which revealed no trauma or bleeding. Started Abilify, which pt was on before and titrated up to 10mg daily with good tolerability. Started Lexapro as well and titrated up to 10mg. Pt agreed to ECT treatments considering severity of her symptoms, suicidality and hx of partial response in the past despite being on medications. Pt showed rapid improvement once ECT was started, with affect becoming more reactive, pt was more visible in the unit, verbal, endorsing gains in terms of mood. Pt was taken off CO after consistently denying SI for a few days. Ms. Briceno is a 72 year old woman with past hx of depression, 2 prior suicide attempts (First one in 2003 via cutting, second one also via cutting which led to this hospitalization) and Hypothyroidism. Pt currently admitted for treatment of depression and suspected delusions in the context of treatment non-adherence over the past year. On admission pt presented sx suggestive of major depressive disorder with melancholic features and possible delusional thinking. Pt presented as dysphoric, with blunted affect, psychomotor retardation, thought blocking, poverty of thought, difficulty concentrating, endorsing anhedonia, passive suicidal ideation, hopelessness, low energy, poor sleep and lack of motivation. Pt also endorsed being convinced her  was having an affair. Of note  did have an affair in the past, however as per family this is not ongoing and  now has significant health comorbidities. On her first weekend pt had to be pout on CO for self injurious behavior, she was banging her head against the radiator in her room. Pt was sent to ED for head CT which revealed no trauma or bleeding. Started Abilify, which pt was on before and titrated up to 10mg daily with good tolerability. Started Lexapro as well and titrated up to 10mg. Pt agreed to ECT treatments considering severity of her symptoms, suicidality and hx of partial response in the past despite being on medications. Pt showed rapid improvement once ECT was started, with affect becoming more reactive, pt was more visible in the unit, verbal, endorsing gains in terms of mood. Pt was taken off CO after consistently denying SI for a few days.     Patient completed 7 bifrontal ECT treatments. She tolerated them well. She had some cognitive deficits towards latter period of acute ECT but all resolved within a day. She became euthymic, with neutral affect, linear thought process and had sustained absence of suicidal thoughts as well as delusions. Patient agreed to recommendation for ongoing outpatient ECT, at the minimum to taper frequency. She was informed that one monthly maintenance ECT is an option which may allow her to go without medications should she choose.

## 2023-03-29 NOTE — BH INPATIENT PSYCHIATRY PROGRESS NOTE - NSBHCHARTREVIEWVS_PSY_A_CORE FT
Vital Signs Last 24 Hrs  T(C): 36.1 (03-29-23 @ 06:21), Max: 36.3 (03-28-23 @ 15:35)  T(F): 97 (03-29-23 @ 06:21), Max: 97.3 (03-28-23 @ 15:35)  HR: --  BP: --  BP(mean): --  RR: --  SpO2: 94% (03-29-23 @ 06:21) (94% - 96%)    Orthostatic VS  03-29-23 @ 06:21  Lying BP: 123/52 HR: 66  Sitting BP: 137/55 HR: 62  Standing BP: --/-- HR: --  Site: --  Mode: --  Orthostatic VS  03-28-23 @ 05:12  Lying BP: 121/59 HR: 72  Sitting BP: 131/56 HR: 71  Standing BP: --/-- HR: --  Site: upper left arm  Mode: electronic

## 2023-03-29 NOTE — BH INPATIENT PSYCHIATRY PROGRESS NOTE - NSBHFUPINTERVALHXFT_PSY_A_CORE
Patient seen for depression with psychosis. Chart and history reviewed and discussed with nursing team. No behavioral events or PRNs needed. Pt is more visible in the unit, affect is brighter and reactive. Reports sustained improvement in terms of mood, feeling "happier" and denies suicidal thoughts or thoughts of self harm. Pt agreeing to continue her medications at home and considering maintenance ECT as well. Pt no longer verbalizing thoughts her  is having an affair at this time. Possible discharge next week on Tuesday.

## 2023-03-29 NOTE — BH INPATIENT PSYCHIATRY PROGRESS NOTE - NSBHCHARTREVIEWLAB_PSY_A_CORE FT
CBC Full  -  ( 12 Mar 2023 11:28 )  WBC Count : 7.12 K/uL  RBC Count : 5.11 M/uL  Hemoglobin : 13.8 g/dL  Hematocrit : 42.7 %  Platelet Count - Automated : 213 K/uL  Mean Cell Volume : 83.6 fL  Mean Cell Hemoglobin : 27.0 pg  Mean Cell Hemoglobin Concentration : 32.3 gm/dL  Auto Neutrophil # : 4.90 K/uL  Auto Lymphocyte # : 1.41 K/uL  Auto Monocyte # : 0.74 K/uL  Auto Eosinophil # : 0.04 K/uL  Auto Basophil # : 0.01 K/uL  Auto Neutrophil % : 68.8 %  Auto Lymphocyte % : 19.8 %  Auto Monocyte % : 10.4 %  Auto Eosinophil % : 0.6 %  Auto Basophil % : 0.1 %  03-12    135  |  97<L>  |  10  ----------------------------<  92  3.8   |  27  |  0.52    Ca    9.2      12 Mar 2023 11:28    TPro  6.8  /  Alb  4.4  /  TBili  0.9  /  DBili  x   /  AST  10  /  ALT  9   /  AlkPhos  66  03-12  
03-17    136  |  100  |  6<L>  ----------------------------<  96  3.5   |  25  |  0.56    Ca    9.1      17 Mar 2023 08:15    CBC Full  -  ( 12 Mar 2023 11:28 )  WBC Count : 7.12 K/uL  RBC Count : 5.11 M/uL  Hemoglobin : 13.8 g/dL  Hematocrit : 42.7 %  Platelet Count - Automated : 213 K/uL  Mean Cell Volume : 83.6 fL  Mean Cell Hemoglobin : 27.0 pg  Mean Cell Hemoglobin Concentration : 32.3 gm/dL  Auto Neutrophil # : 4.90 K/uL  Auto Lymphocyte # : 1.41 K/uL  Auto Monocyte # : 0.74 K/uL  Auto Eosinophil # : 0.04 K/uL  Auto Basophil # : 0.01 K/uL  Auto Neutrophil % : 68.8 %  Auto Lymphocyte % : 19.8 %  Auto Monocyte % : 10.4 %  Auto Eosinophil % : 0.6 %  Auto Basophil % : 0.1 %  03-12    135  |  97<L>  |  10  ----------------------------<  92  3.8   |  27  |  0.52    Ca    9.2      12 Mar 2023 11:28    TPro  6.8  /  Alb  4.4  /  TBili  0.9  /  DBili  x   /  AST  10  /  ALT  9   /  AlkPhos  66  03-12  
CBC Full  -  ( 12 Mar 2023 11:28 )  WBC Count : 7.12 K/uL  RBC Count : 5.11 M/uL  Hemoglobin : 13.8 g/dL  Hematocrit : 42.7 %  Platelet Count - Automated : 213 K/uL  Mean Cell Volume : 83.6 fL  Mean Cell Hemoglobin : 27.0 pg  Mean Cell Hemoglobin Concentration : 32.3 gm/dL  Auto Neutrophil # : 4.90 K/uL  Auto Lymphocyte # : 1.41 K/uL  Auto Monocyte # : 0.74 K/uL  Auto Eosinophil # : 0.04 K/uL  Auto Basophil # : 0.01 K/uL  Auto Neutrophil % : 68.8 %  Auto Lymphocyte % : 19.8 %  Auto Monocyte % : 10.4 %  Auto Eosinophil % : 0.6 %  Auto Basophil % : 0.1 %  03-12    135  |  97<L>  |  10  ----------------------------<  92  3.8   |  27  |  0.52    Ca    9.2      12 Mar 2023 11:28    TPro  6.8  /  Alb  4.4  /  TBili  0.9  /  DBili  x   /  AST  10  /  ALT  9   /  AlkPhos  66  03-12  
CBC Full  -  ( 12 Mar 2023 11:28 )  WBC Count : 7.12 K/uL  RBC Count : 5.11 M/uL  Hemoglobin : 13.8 g/dL  Hematocrit : 42.7 %  Platelet Count - Automated : 213 K/uL  Mean Cell Volume : 83.6 fL  Mean Cell Hemoglobin : 27.0 pg  Mean Cell Hemoglobin Concentration : 32.3 gm/dL  Auto Neutrophil # : 4.90 K/uL  Auto Lymphocyte # : 1.41 K/uL  Auto Monocyte # : 0.74 K/uL  Auto Eosinophil # : 0.04 K/uL  Auto Basophil # : 0.01 K/uL  Auto Neutrophil % : 68.8 %  Auto Lymphocyte % : 19.8 %  Auto Monocyte % : 10.4 %  Auto Eosinophil % : 0.6 %  Auto Basophil % : 0.1 %  03-12    135  |  97<L>  |  10  ----------------------------<  92  3.8   |  27  |  0.52    Ca    9.2      12 Mar 2023 11:28    TPro  6.8  /  Alb  4.4  /  TBili  0.9  /  DBili  x   /  AST  10  /  ALT  9   /  AlkPhos  66  03-12  
03-17    136  |  100  |  6<L>  ----------------------------<  96  3.5   |  25  |  0.56    Ca    9.1      17 Mar 2023 08:15    CBC Full  -  ( 12 Mar 2023 11:28 )  WBC Count : 7.12 K/uL  RBC Count : 5.11 M/uL  Hemoglobin : 13.8 g/dL  Hematocrit : 42.7 %  Platelet Count - Automated : 213 K/uL  Mean Cell Volume : 83.6 fL  Mean Cell Hemoglobin : 27.0 pg  Mean Cell Hemoglobin Concentration : 32.3 gm/dL  Auto Neutrophil # : 4.90 K/uL  Auto Lymphocyte # : 1.41 K/uL  Auto Monocyte # : 0.74 K/uL  Auto Eosinophil # : 0.04 K/uL  Auto Basophil # : 0.01 K/uL  Auto Neutrophil % : 68.8 %  Auto Lymphocyte % : 19.8 %  Auto Monocyte % : 10.4 %  Auto Eosinophil % : 0.6 %  Auto Basophil % : 0.1 %  03-12    135  |  97<L>  |  10  ----------------------------<  92  3.8   |  27  |  0.52    Ca    9.2      12 Mar 2023 11:28    TPro  6.8  /  Alb  4.4  /  TBili  0.9  /  DBili  x   /  AST  10  /  ALT  9   /  AlkPhos  66  03-12  
CBC Full  -  ( 12 Mar 2023 11:28 )  WBC Count : 7.12 K/uL  RBC Count : 5.11 M/uL  Hemoglobin : 13.8 g/dL  Hematocrit : 42.7 %  Platelet Count - Automated : 213 K/uL  Mean Cell Volume : 83.6 fL  Mean Cell Hemoglobin : 27.0 pg  Mean Cell Hemoglobin Concentration : 32.3 gm/dL  Auto Neutrophil # : 4.90 K/uL  Auto Lymphocyte # : 1.41 K/uL  Auto Monocyte # : 0.74 K/uL  Auto Eosinophil # : 0.04 K/uL  Auto Basophil # : 0.01 K/uL  Auto Neutrophil % : 68.8 %  Auto Lymphocyte % : 19.8 %  Auto Monocyte % : 10.4 %  Auto Eosinophil % : 0.6 %  Auto Basophil % : 0.1 %  03-12    135  |  97<L>  |  10  ----------------------------<  92  3.8   |  27  |  0.52    Ca    9.2      12 Mar 2023 11:28    TPro  6.8  /  Alb  4.4  /  TBili  0.9  /  DBili  x   /  AST  10  /  ALT  9   /  AlkPhos  66  03-12  
03-17    136  |  100  |  6<L>  ----------------------------<  96  3.5   |  25  |  0.56    Ca    9.1      17 Mar 2023 08:15    CBC Full  -  ( 12 Mar 2023 11:28 )  WBC Count : 7.12 K/uL  RBC Count : 5.11 M/uL  Hemoglobin : 13.8 g/dL  Hematocrit : 42.7 %  Platelet Count - Automated : 213 K/uL  Mean Cell Volume : 83.6 fL  Mean Cell Hemoglobin : 27.0 pg  Mean Cell Hemoglobin Concentration : 32.3 gm/dL  Auto Neutrophil # : 4.90 K/uL  Auto Lymphocyte # : 1.41 K/uL  Auto Monocyte # : 0.74 K/uL  Auto Eosinophil # : 0.04 K/uL  Auto Basophil # : 0.01 K/uL  Auto Neutrophil % : 68.8 %  Auto Lymphocyte % : 19.8 %  Auto Monocyte % : 10.4 %  Auto Eosinophil % : 0.6 %  Auto Basophil % : 0.1 %  03-12    135  |  97<L>  |  10  ----------------------------<  92  3.8   |  27  |  0.52    Ca    9.2      12 Mar 2023 11:28    TPro  6.8  /  Alb  4.4  /  TBili  0.9  /  DBili  x   /  AST  10  /  ALT  9   /  AlkPhos  66  03-12  

## 2023-03-29 NOTE — BH INPATIENT PSYCHIATRY PROGRESS NOTE - NSBHASSESSSUMMFT_PSY_ALL_CORE
Patient is a 72 year old  retired  female currently residing in a private residence with  (daughter/son in law and 3 grandchildren live upstairs in a mother/daughter home). PPH MDD. Hx of 1 past inpatient admission 10-15 years ago (hospital unknown). Hx of 1 past suicide attempt 10-15 years ago via cutting- patient reports she required stitches. No history of aggression/violence, legal issues or substance use issues. PMH- Hypothyroidism. BIB  for depression.    Patient presents to the ED in the context of depression. Patient endorses stopping psychiatric medication and over the last 2 weeks has had onset of depressive symptoms. She has multiple psychosocial stressors- various family members are ill. Patient had been having passive suicidal ideation but yesterday engaged in SIB- cutting self with scissors with ambivalent suicidal intent. She continues to endorse depression, anhedonia and  is not at baseline. She is unpredictable and impulsive and is requesting and agreeing to voluntary inpatient admission.    3/13 Update: Transferring pt to the ED for head CT after pt was seen hitting her head against the radiator during the weekend. Pt appears dysphoric on encounter, reporting hx of chronic depression with recent worsening after stopping her Abilify, culminating on a suicide attempt cutting her wrists with a pair of scissors. Pt also endorsing long standing delusional jealousy. Daughter advocating for ECT treatment.     3/15 Update: Remains on CO for suicidality. Pt denies interval changes. Starting Abilify 5mg QD and Lexapro 5mg QD today. Pt interested in trying ECT. Consult placed. Hospitalist clearance pending. Pt given a copy of the ECT consent form.     3/16: Pt remans on CO for self-injurious behavior. Continues to present depressed mood, psychomotor retardation and hopelessness. Stays in her room most of the day, withdrawn and negativistic. ECT #1 scheduled for tomorrow. Covid swab pending.     3/17: Pt remains on CO for self injurious behavior. Pt has been compliant with her medications. Remains withdraws, stays in her room most of the day, often sitting by the edge of her bed, with psychomotor retardation. Continues to reports low mood, passive SI, anhedonia, lack of motivation and difficulty concentrating.  Presentation suggestive of melancholic depression. Refused ECT today, agreed to try again Monday morning.     3/18: depressed, withdrawn, isolated, minimally interactive.  3/19 remains isolated, preoccupied, depressed, compliant with meds, lab this am, agreed to go for ECT tomorrow.     3/20: Pt completed her 1st ECT today.  Pt presented with more reactive affect, with less thought blocking. Reports feeling better after ECT. Denies suicidal thoughts or thoughts of self harm at this time. If pt still denying SI tomorrow may d/c CO. Will continue ECT Wednesday and current med regimen.     3/21: Pt continues to report low mood and appears dysphoric during rounds today after limited improvement after ECT yesterday. Will increase Lexparo to 10mg QD and continue acute ECT course 3x/week. Will discontinue CO at this time as pt consistently denies SIIP and contracts for safety. ECT #2 tomorrow 3/22.    3/22: Pt completed ECT #2 today. Will continue titrating Lexapro and Abilify. Next ECT scheduled for Friday 3/24.    3/23: Pt reports modest gains in terms of mood. Still dysphoric. Denies SI. Will increase Abilify to 10mg QD.     3/24: Pt completed ECT #3 today, next ECT scheduled for Monday 3/27. Still dysphoric, minor gains.     3/27: Pt appears brighter today, reports gains in terms of mood. S/P ECT #4 today.     3/28: Pt presenting sustained gains in terms of mood. ECT #5 tomorrow.     3/29: ECT #5 today. Likely discharge next week on Tuesday.     MDD with melancholic features  Cognitive impairment vs. Pseudo dementia symptoms related to MDD  Delusional Disorder? - chronic thoughts  is cheating on her  Continue ECT 3x/week  Start Lexapro 5mg QD (3/14), increase to 10mg QD (3/21)   Start Abilify 5mg QD (3/14), increased to 7mg on 3/22, increased to 10mg for 3/24. May consider Abilify Maintena to aid with compliance  Dispo: [facility]

## 2023-03-30 PROCEDURE — 99231 SBSQ HOSP IP/OBS SF/LOW 25: CPT

## 2023-03-30 RX ADMIN — Medication 50 MICROGRAM(S): at 06:37

## 2023-03-30 RX ADMIN — ARIPIPRAZOLE 10 MILLIGRAM(S): 15 TABLET ORAL at 08:33

## 2023-03-30 RX ADMIN — ESCITALOPRAM OXALATE 10 MILLIGRAM(S): 10 TABLET, FILM COATED ORAL at 08:34

## 2023-03-30 NOTE — BH INPATIENT PSYCHIATRY PROGRESS NOTE - NSBHMSEAFFRANGE_PSY_A_CORE
Blunted
Blunted
Constricted
Blunted
Constricted
Blunted
Constricted
Constricted
Blunted
Constricted
Blunted
Constricted
Blunted

## 2023-03-30 NOTE — BH INPATIENT PSYCHIATRY PROGRESS NOTE - NSBHASSESSSUMMFT_PSY_ALL_CORE
Patient is a 72 year old  retired  female currently residing in a private residence with  (daughter/son in law and 3 grandchildren live upstairs in a mother/daughter home). PPH MDD. Hx of 1 past inpatient admission 10-15 years ago (hospital unknown). Hx of 1 past suicide attempt 10-15 years ago via cutting- patient reports she required stitches. No history of aggression/violence, legal issues or substance use issues. PMH- Hypothyroidism. BIB  for depression. Admitted for depression, suicidal thoughts, self injurious behavior (cut self with scissors) in the context of poor treatment compliance and multiple psychosocial stressors (various ill family members). Also was noted to have delusions of jealousy. Started ECT (after starting abilify, lexapro) with good initial effects so far. Has had 5 treatments so far.     Plan:    MDD with melancholic features  Cognitive impairment vs. Pseudo dementia symptoms related to MDD  Delusional Disorder? - chronic thoughts  is cheating on her  Continue ECT 3x/week - #6 tomorrow  Continue Lexapro 10mg po daily, abilify 10mg po daily  Dispo: d/c to home with outpatient ECT and GOPD with Dr. Cruz 4/10/23

## 2023-03-30 NOTE — BH DISCHARGE NOTE NURSING/SOCIAL WORK/PSYCH REHAB - PATIENT PORTAL LINK FT
Pt mom is asking about how to check pt and siblings for sickness related to mold   You can access the FollowMyHealth Patient Portal offered by [facility] by registering at the following website: [email or web address]. By joining [facility] FollowMyHealth portal, you will also be able to view your health information using other applications (apps) compatible with our system.

## 2023-03-30 NOTE — BH DISCHARGE NOTE NURSING/SOCIAL WORK/PSYCH REHAB - NSDCADDINFO2FT_PSY_ALL_CORE
please see provided information for fasting instructions and parking permit.  please arrive 15 minutes before the above time.

## 2023-03-30 NOTE — BH DISCHARGE NOTE NURSING/SOCIAL WORK/PSYCH REHAB - NSCDUDCCRISIS_PSY_A_CORE
Critical access hospital Well  1 [phone]) Critical access hospital-WELL ([phone])  Text "WELL" to [phone]  Website: www.[employer]/.[facility] 1 (942) 621-HDLY (7456) Website: www.safehorizon.org/.National Suicide Prevention Lifeline 8 (304) 394-2746/.  [facility]  1 (317) LIFENET (975-2456)/.  [facility]  [phone] [place] [phone]   Hours:  Monday through Friday from 9 AM to 3 PM/988 Suicide and Crisis Lifeline

## 2023-03-30 NOTE — BH INPATIENT PSYCHIATRY PROGRESS NOTE - NSBHMETABOLIC_PSY_ALL_CORE_FT
BMI: BMI (kg/m2): 27.1 (03-13-23 @ 12:49)  HbA1c: A1C with Estimated Average Glucose Result: 5.4 % (03-13-23 @ 08:00)    Glucose: POCT Blood Glucose.: 101 mg/dL (03-11-23 @ 17:05)    BP: 140/78 (03-29-23 @ 18:11) (123/72 - 182/67)  Lipid Panel: Date/Time: 03-13-23 @ 08:00  Cholesterol, Serum: 158  Direct LDL: --  HDL Cholesterol, Serum: 66  Total Cholesterol/HDL Ration Measurement: --  Triglycerides, Serum: 55

## 2023-03-30 NOTE — BH INPATIENT PSYCHIATRY PROGRESS NOTE - NSBHFUPINTERVALHXFT_PSY_A_CORE
Chart reviewed and case discussed with treatment team. Staff report patient has been compliant with treatment though out for meals only most of the time - suggestive . Patient was encouraged to have increased group participation. Patient does report slight improvement in mood. Denies SI.    Chart reviewed and case discussed with treatment team. Staff report patient has been compliant with treatment though out for meals only most of the time - suggestive of ongoing anhedonia. Patient was encouraged to have increased group participation. Patient does report slight improvement in mood. Denies SI. No apparent signs of cognitive deficits as a result of ECT.

## 2023-03-30 NOTE — BH INPATIENT PSYCHIATRY PROGRESS NOTE - NSBHMSESPABN_PSY_A_CORE
Soft volume/Slowed rate/Decreased productivity
Soft volume/Decreased productivity
Soft volume/Slowed rate/Decreased productivity
Soft volume/Slowed rate/Decreased productivity
Soft volume/Decreased productivity
Soft volume/Slowed rate/Decreased productivity
Soft volume/Decreased productivity
Soft volume/Slowed rate/Decreased productivity
Soft volume/Slowed rate/Decreased productivity
Soft volume/Decreased productivity

## 2023-03-30 NOTE — BH INPATIENT PSYCHIATRY PROGRESS NOTE - NSBHMSEAFFQUAL_PSY_A_CORE
Depressed/Anxious

## 2023-03-30 NOTE — BH DISCHARGE NOTE NURSING/SOCIAL WORK/PSYCH REHAB - NSDCADDINFO1FT_PSY_ALL_CORE
please arrive 15 minutes in advance.    There is free  parking in front of the building _ this is best option.  (you can park on in the community or if you park on campus there is a side door to enter)     If you decide you would like verbal therapy, you can discuss  a referral with Dr Cruz  or your assigned psychiatrist.

## 2023-03-30 NOTE — BH INPATIENT PSYCHIATRY PROGRESS NOTE - LEVEL OF CONSCIOUSNESS
Medication: Adderall 10 mg XR  Issues/Concerns: States the pharmacy will not cover current adderall prescription and would like a generic version ordered.   Please advise  Pharmacy: [facility] [place]  Patient out of medication: Yes    
Alert

## 2023-03-30 NOTE — BH DISCHARGE NOTE NURSING/SOCIAL WORK/PSYCH REHAB - DISCHARGE INSTRUCTIONS AFTERCARE APPOINTMENTS
In order to check the location, date, or time of your aftercare appointment, please refer to your Discharge Instructions Document given to you upon leaving the hospital.  If you have lost the instructions please call [phone]

## 2023-03-30 NOTE — BH INPATIENT PSYCHIATRY PROGRESS NOTE - NSBHMSEKNOWHOW_PSY_ALL_CORE
Current Events

## 2023-03-30 NOTE — BH INPATIENT PSYCHIATRY PROGRESS NOTE - OTHER
SUPERFICIAL, withdrawn

## 2023-03-30 NOTE — BH INPATIENT PSYCHIATRY PROGRESS NOTE - NSBHMSEREMMEM_PSY_A_CORE
Impaired

## 2023-03-30 NOTE — BH INPATIENT PSYCHIATRY PROGRESS NOTE - NSBHMSETHTCONTENT_PSY_A_CORE
Hopelessness/Guilt

## 2023-03-30 NOTE — BH DISCHARGE NOTE NURSING/SOCIAL WORK/PSYCH REHAB - NSDCPRGOAL_PSY_ALL_CORE
Pt was seen prior to discharge. Pt was pleasant and engaged in session. Pt denied current symptoms of depression, anxiety, and SI/HI. Pt was hopeful and future oriented. She expressed plans to go out to lunch with her family and join a gym. Pt exhibited a bright affect. Pt was moderately social and visible in the milieu prior to discharge.  Pt’s group engagement and participation increased; however, remained intermittent. Pt was provided with a press-ganey survey.

## 2023-03-30 NOTE — BH INPATIENT PSYCHIATRY PROGRESS NOTE - NSBHCHARTREVIEWVS_PSY_A_CORE FT
Vital Signs Last 24 Hrs  T(C): 36.2 (03-30-23 @ 05:39), Max: 37.1 (03-29-23 @ 16:25)  T(F): 97.2 (03-30-23 @ 05:39), Max: 98.7 (03-29-23 @ 16:25)  HR: 89 (03-29-23 @ 18:11) (61 - 94)  BP: 140/78 (03-29-23 @ 18:11) (123/72 - 182/67)  BP(mean): --  RR: 18 (03-29-23 @ 18:11) (14 - 20)  SpO2: 97% (03-29-23 @ 18:11) (94% - 100%)    Orthostatic VS  03-30-23 @ 05:39  Lying BP: 116/51 HR: 64  Sitting BP: 115/50 HR: 68  Standing BP: --/-- HR: --  Site: --  Mode: --  Orthostatic VS  03-29-23 @ 06:21  Lying BP: 123/52 HR: 66  Sitting BP: 137/55 HR: 62  Standing BP: --/-- HR: --  Site: --  Mode: --   Vital Signs Last 24 Hrs  T(C): 36.7 (03-30-23 @ 15:35), Max: 37.1 (03-29-23 @ 16:25)  T(F): 98 (03-30-23 @ 15:35), Max: 98.7 (03-29-23 @ 16:25)  HR: 89 (03-29-23 @ 18:11) (61 - 94)  BP: 140/78 (03-29-23 @ 18:11) (123/72 - 182/67)  BP(mean): --  RR: 18 (03-29-23 @ 18:11) (14 - 20)  SpO2: 97% (03-29-23 @ 18:11) (94% - 100%)    Orthostatic VS  03-30-23 @ 05:39  Lying BP: 116/51 HR: 64  Sitting BP: 115/50 HR: 68  Standing BP: --/-- HR: --  Site: --  Mode: --  Orthostatic VS  03-29-23 @ 06:21  Lying BP: 123/52 HR: 66  Sitting BP: 137/55 HR: 62  Standing BP: --/-- HR: --  Site: --  Mode: --

## 2023-03-30 NOTE — BH INPATIENT PSYCHIATRY PROGRESS NOTE - NSBHMSEMOOD_PSY_A_CORE
Depressed

## 2023-03-30 NOTE — BH DISCHARGE NOTE NURSING/SOCIAL WORK/PSYCH REHAB - NSBHDCADDR2FT_A_CORE
[facility]  [facility]   [phone] [facility] [id number]

## 2023-03-31 PROCEDURE — 90870 ELECTROCONVULSIVE THERAPY: CPT

## 2023-03-31 PROCEDURE — 99214 OFFICE O/P EST MOD 30 MIN: CPT | Mod: 25

## 2023-03-31 RX ADMIN — Medication 50 MICROGRAM(S): at 05:31

## 2023-03-31 RX ADMIN — ARIPIPRAZOLE 10 MILLIGRAM(S): 15 TABLET ORAL at 12:38

## 2023-03-31 RX ADMIN — ESCITALOPRAM OXALATE 10 MILLIGRAM(S): 10 TABLET, FILM COATED ORAL at 12:39

## 2023-03-31 NOTE — BH TREATMENT PLAN - NSTXDCOPNOINTERSW_PSY_ALL_CORE
sw provided support and dc planning. pt will return home but her family suggest change of outpt providers. pt is unable to effectively plan for self due to mental status.  outpt  ect is likely
pt is  more engaged with dc planning but defers to her daughter Jazlyn.   sw made internal referral for ECT kiesha clearance. will refer to [facility] if agreed to by pt and family.
sw met with pt. pt signed release of info to Prior psychatrist Dallas Muhammad and explained no longer in treatment- referred her elsewhere but at that appointment the new provider reportedly reviewed a handoff letter and stated Dr Gomez ended relationship due to patient's 'memory' problems. Pt then explained she did not return to the new MD as she did not like the new MD. No other treatment provider arranged.   Pt agreed to interface with team to improve mental status and adddress dc planning.

## 2023-03-31 NOTE — BH TREATMENT PLAN - NSTXCOPEINTERRN_PSY_ALL_CORE
Assist pt. with identifying 2 coping mechanisms to use while in the hospital, that can be used after discharge. Reinforce treatment plan. encourage participation in activities.
Spoke with child's mom Alvina Glenn wanted to confirm date and time of apt tomorrow 
Assist pt. with identifying 2 coping mechanisms to use while in the hospital, that can be used after discharge. Reinforce treatment plan. encourage participation in activities.
Encourage to utilize her coping skills.

## 2023-03-31 NOTE — BH TREATMENT PLAN - NSTXCOPEINTERPR_PSY_ALL_CORE
Pt was able to begin identifying coping skills given assistance including deep breathing and spending time with her family (, grandchildren). Pt reports difficulty utilizing coping skills. She was provided with psychoeducation and encouragement to work on utilizing additional coping skills including deep breathing, walking, and positive self-talk. Psych rehab staff will continue to engage patient daily in order to maintain rapport, provide support and assist patient in demonstrating progress towards Psychiatric Rehabilitation goals over the next 7 days.
Pt made noted progress towards goal attainment. Pt has been more actively utilizing coping skills without assistance including socializing with peers, and walking. Pt has difficulty independently identifying coping skills requiring prompting.  She will benefit from continuing to work on identifying and utilizing coping skills to facilitate symptom management and independent use of coping skills upon discharge.
Pt was able to begin identifying coping skills given assistance including deep breathing and spending time with her family (, grandchildren). Pt reports difficulty utilizing coping skills. She was provided with psychoeducation and encouragement to work on utilizing additional coping skills including deep breathing, walking, and positive self-talk. Psych rehab staff will continue to engage patient daily in order to maintain rapport, provide support and assist patient in demonstrating progress towards Psychiatric Rehabilitation goals over the next 7 days.

## 2023-03-31 NOTE — BH TREATMENT PLAN - NSTXPATIENTPARTICIPATE_PSY_ALL_CORE
Patient participated in identification of needs/problems/goals for treatment/Patient participated in defining interventions/Patient participated in development of after care plan
Patient participated in identification of needs/problems/goals for treatment/Patient participated in defining interventions/Patient participated in development of after care plan
Patient participated in identification of needs/problems/goals for treatment

## 2023-03-31 NOTE — BH TREATMENT PLAN - NSTXSUICIDGOAL_PSY_ALL_CORE
Talk to staff and ask for assistance when having suicidal wishes instead of acting out
Be able to state 3 reasons for living
Will verbalize a decrease in preoccupation with suicidal thoughts and / or intent to commit suicide to 2 on a 10-point scale

## 2023-03-31 NOTE — BH INPATIENT PSYCHIATRY PROGRESS NOTE - NSBHMETABOLIC_PSY_ALL_CORE_FT
BMI: BMI (kg/m2): 27.1 (03-13-23 @ 12:49)  HbA1c: A1C with Estimated Average Glucose Result: 5.4 % (03-13-23 @ 08:00)    Glucose: POCT Blood Glucose.: 101 mg/dL (03-11-23 @ 17:05)    BP: 161/68 (03-31-23 @ 12:00) (105/50 - 182/67)  Lipid Panel: Date/Time: 03-13-23 @ 08:00  Cholesterol, Serum: 158  Direct LDL: --  HDL Cholesterol, Serum: 66  Total Cholesterol/HDL Ration Measurement: --  Triglycerides, Serum: 55

## 2023-03-31 NOTE — BH TREATMENT PLAN - NSTXCAREGIVERPARTICIPATE_PSY_P_CORE
Family/Caregiver participated in identification of needs/problems/goals for treatment/Family/Caregiver participated in defining interventions/Family/Caregiver participated in development of after care plan
Family/Caregiver participated in identification of needs/problems/goals for treatment/Family/Caregiver participated in defining interventions/Family/Caregiver participated in development of after care plan
Family/Caregiver participated in identification of needs/problems/goals for treatment

## 2023-03-31 NOTE — ECT TREATMENT NOTE - NSECTPOSTTXCOMMENTS_PSY_ALL_CORE
SUGGEST INCREASED ENERGY AT NEXT TX FOR TREND TO SHORTER SEIZURE DURATION 
Delayed motor onset suggest increase in stimulus dose next session 
Reported improvement, denied cognitive issues.

## 2023-03-31 NOTE — BH INPATIENT PSYCHIATRY PROGRESS NOTE - NSBHASSESSSUMMFT_PSY_ALL_CORE
Patient is a 72 year old  retired  female currently residing in a private residence with  (daughter/son in law and 3 grandchildren live upstairs in a mother/daughter home). PPH MDD. Hx of 1 past inpatient admission 10-15 years ago (hospital unknown). Hx of 1 past suicide attempt 10-15 years ago via cutting- patient reports she required stitches. No history of aggression/violence, legal issues or substance use issues. PMH- Hypothyroidism. BIB  for depression. Admitted for depression, suicidal thoughts, self injurious behavior (cut self with scissors) in the context of poor treatment compliance and multiple psychosocial stressors (various ill family members). Also was noted to have delusions of jealousy. Started ECT (after starting abilify, lexapro) with good effects so far.     Plan:    MDD with melancholic features  Cognitive impairment vs. Pseudo dementia symptoms related to MDD  Delusional Disorder? - chronic thoughts  is cheating on her though he does have a h/o infidelity  Continue ECT 3x/week - #7 on Monday  Continue Lexapro 10mg po daily, abilify 10mg po daily  Dispo: d/c to home with outpatient ECT and GOPD with Dr. Cruz 4/10/23 - daughter Jazlyn updated today

## 2023-03-31 NOTE — BH INPATIENT PSYCHIATRY PROGRESS NOTE - NSBHFUPINTERVALHXFT_PSY_A_CORE
Chart reviewed and case discussed with treatment team. Staff report patient has been calm, cooperative, slept well and med compliant. Patient does minimize symptoms particularly in terms of severity of suicidal thoughts. Does acknowledge she was feeling depressed and now doing better. Denies any side effects to medications or ECT. Well oriented. VSS.

## 2023-03-31 NOTE — BH TREATMENT PLAN - NSTXSUICIDINTERRN_PSY_ALL_CORE
Assess any signs of self injurious behavior.provide safe environment
Maintain Constant Observation 1:1. Maintain safe environment. Encourage participation in unit activities. Assist pt. with identifying more constructive ways  of handling her negative feelings to avoid self-harm.
Provide a safe environment. Present opportunities for the patient to express thoughts, and feelings in a nonjudgmental environment.

## 2023-03-31 NOTE — BH TREATMENT PLAN - NSTXPLANTHERAPYSESSIONSFT_PSY_ALL_CORE
03-31-23  Type of therapy: Coping skills,Leisure development,Peer advocate,Spirituality  Type of session: Individual  Level of patient participation: Participated with encouragement  Duration of participation: 15 minutes  Therapy conducted by: Psych rehab  Therapy Summary: Pt was seen 1:1 in the dining room. Pt was pleasant and engaged in session. Pt denied current symptoms of depression, anxiety, and SI/HI.  Given prompting, she was able to recognize that ECT could be contributing to her improvement. However, pt continued to minimize her initial presentation and appeared to have difficulty recalling the events leading to her admission as well as her initial time on [facility].  In relation not illness symptoms, pt no longer appears anxious, irritable, and despondent. She exhibits a bright affect and is now socializing more with peers. Pt’s group engagement and participation has increased. She is intermittently visible in the milieu. Psych rehab staff will continue to engage patient daily in order to maintain rapport, provide support and assist patient in demonstrating progress towards Psychiatric Rehabilitation goals over the next 7 days.  
  03-17-23  Type of therapy: none  Type of session: Individual  Level of patient participation: Participated with encouragement  Duration of participation: 15 minutes  Therapy conducted by: Psych rehab  --

## 2023-03-31 NOTE — BH INPATIENT PSYCHIATRY PROGRESS NOTE - NSBHCHARTREVIEWVS_PSY_A_CORE FT
Vital Signs Last 24 Hrs  T(C): 36.8 (03-31-23 @ 15:40), Max: 36.9 (03-31-23 @ 10:51)  T(F): 98.2 (03-31-23 @ 15:40), Max: 98.4 (03-31-23 @ 10:51)  HR: 69 (03-31-23 @ 12:00) (64 - 76)  BP: 161/68 (03-31-23 @ 12:00) (105/50 - 161/68)  BP(mean): --  RR: 17 (03-31-23 @ 12:00) (17 - 18)  SpO2: 97% (03-31-23 @ 15:40) (97% - 100%)    Orthostatic VS  03-31-23 @ 05:04  Lying BP: 129/61 HR: 60  Sitting BP: 145/60 HR: 63  Standing BP: --/-- HR: --  Site: upper left arm  Mode: electronic  Orthostatic VS  03-30-23 @ 05:39  Lying BP: 116/51 HR: 64  Sitting BP: 115/50 HR: 68  Standing BP: --/-- HR: --  Site: --  Mode: --

## 2023-03-31 NOTE — ECT TREATMENT NOTE - NSICDXBHTERTIARYDX_PSY_ALL_CORE
R/O Delusional disorder, jealous type   F22  

## 2023-03-31 NOTE — BH TREATMENT PLAN - NSBHPRIMARYDX_PSY_ALL_CORE
Severe recurrent depression with psychosis    

## 2023-03-31 NOTE — BH TREATMENT PLAN - NSTXSUICIDINTERMD_PSY_ALL_CORE
Psychopharmacology and psychoeducation. Starting ECT.
Psychopharmacology and psychoeducation. Continue ECT.
Psychopharmacology and psychoeducation. Continue ECT.

## 2023-04-01 RX ADMIN — ARIPIPRAZOLE 10 MILLIGRAM(S): 15 TABLET ORAL at 09:59

## 2023-04-01 RX ADMIN — ESCITALOPRAM OXALATE 10 MILLIGRAM(S): 10 TABLET, FILM COATED ORAL at 09:59

## 2023-04-01 RX ADMIN — Medication 50 MICROGRAM(S): at 06:09

## 2023-04-02 LAB — SARS-COV-2 RNA SPEC QL NAA+PROBE: SIGNIFICANT CHANGE UP

## 2023-04-02 RX ADMIN — ESCITALOPRAM OXALATE 10 MILLIGRAM(S): 10 TABLET, FILM COATED ORAL at 10:00

## 2023-04-02 RX ADMIN — ARIPIPRAZOLE 10 MILLIGRAM(S): 15 TABLET ORAL at 10:01

## 2023-04-02 RX ADMIN — Medication 50 MICROGRAM(S): at 06:11

## 2023-04-03 PROCEDURE — 90870 ELECTROCONVULSIVE THERAPY: CPT

## 2023-04-03 PROCEDURE — 99231 SBSQ HOSP IP/OBS SF/LOW 25: CPT | Mod: 25

## 2023-04-03 RX ADMIN — ARIPIPRAZOLE 10 MILLIGRAM(S): 15 TABLET ORAL at 13:00

## 2023-04-03 RX ADMIN — ESCITALOPRAM OXALATE 10 MILLIGRAM(S): 10 TABLET, FILM COATED ORAL at 13:00

## 2023-04-03 NOTE — ECT PRE-PROCEDURE CHECKLIST - CAREGIVER PHONE NUMBER
[phone]
[phone]
[phone]
[phone]
[phone]
[phone]
[phone]
[phone]
[phone]

## 2023-04-03 NOTE — ECT PRE-PROCEDURE CHECKLIST - PATIENT'S SEXUAL ORIENTATION
Heterosexual

## 2023-04-03 NOTE — ECT PRE-PROCEDURE CHECKLIST - NSPROPTRIGHTBILLOFRIGHTS_GEN_A_NUR
patient
0 = swallows foods/liquids without difficulty
patient

## 2023-04-03 NOTE — ECT PRE-PROCEDURE CHECKLIST - ALLERGY BAND ON
Occupational Therapy    OCCUPATIONAL THERAPY INITIAL EVALUATION     [facility] [place], [place]         ZMOH:                                                  Patient Name: Rosie Orantes    MRN: [id number]    : 1957    Room: [address] [place] [address]      Evaluating OT: Ilya Colemans OTR/L   [id number]      Referring Provider:Loc Anne DO    Specific Provider Orders/Date:OT eval and treat 2023      Diagnosis:  Rectal bleeding [K62.5]  Lower GI bleed [K92.2]  BRBPR (bright red blood per rectum) [K62.5]     Pertinent Medical History: dialysis, ESRD, brain aneurysm , lymphedema B LEs     Precautions:  Fall Risk,      Assessment of current deficits    [x] Functional mobility  [x]ADLs  [x] Strength               []Cognition    [x] Functional transfers   [x] IADLs         [x] Safety Awareness   [x]Endurance    [] Fine Coordination              [x] Balance      [] Vision/perception   []Sensation     []Gross Motor Coordination  [] ROM  [] Delirium                   [] Motor Control     OT PLAN OF CARE   OT POC based on physician orders, patient diagnosis and results of clinical assessment    Frequency/Duration  2-4 days/wk for 2 weeks PRN   Specific OT Treatment Interventions to include:   ADL retraining/adapted techniques and AE recommendations to increase functional independence within precautions                    Energy conservation techniques to improve tolerance for selfcare routine   Functional transfer/mobility training/DME recommendations for increased independence, safety and fall prevention         Patient/family education to increase safety and functional independence             Environmental modifications for safe mobility and completion of ADLs                             Therapeutic activity to improve functional performance during ADLs.                                          Therapeutic exercise to improve tolerance and
functional strength for ADLs    Balance retraining/tolerance tasks for facilitation of postural control with dynamic challenges during ADLs . Positioning to improve functional independence      Recommended Adaptive Equipment: walker     Home Living: Pt lives with daughter,1 story with 1 small step    Bathroom setup: tub/shower   Equipment owned: old walker, wheelchair    Prior Level of Function: assist as needed  with ADLs , assist  with IADLs; ambulated with walker , wheelchair longer distance     Pain Level: LE pain ;   Cognition: A&O:  following commands , conversing    Memory:  good   Sequencing:  good    Problem solving:  fair+   Judgement/safety:  fair+     Functional Assessment:  AM-PAC Daily Activity Raw Score: 17/24   Initial Eval Status  Date: 1/31/2023 Treatment Status  Date: STGs = LTGs  Time frame: 10-14 days   Feeding Independent      Grooming SBA/set-up   Independent    UB Dressing SBA/set-up   Independent    LB Dressing Mod A  LE pain   Mod I    Bathing Min A   Mod I    Toileting Min A   Mod I    Bed Mobility  Min A  Supine to sit   C/o LE pain   Mod I    Functional Transfers Min A  Sit-stand from bed   Mod I    Functional Mobility CGA,w/walker   Ambulating short distance in room   Mod I  with good tolerance    Balance Sitting:     Static:  supervision     Dynamic:min A   Standing: CGA  Independent/supervision    Activity Tolerance No SOB   Good  with ADL activity    Visual/  Perceptual Glasses: none by bedside                 Hand Dominance right   AROM (PROM) Strength Additional Info:    RUE  WFL WFL good  and wfl FMC/dexterity noted during ADL tasks       LUE WFL WFL good  and wfl FMC/dexterity noted during ADL tasks       Hearing: WFL   Sensation:  No c/o numbness or tingling  Tone: WFL   Edema: N    Comments: Upon arrival patient lying in bed . At end of session, patient sitting in chair  with call light and phone within reach, all lines and tubes intact.   Daughter present inroom
Overall patient demonstrated  decreased independence and safety during completion of ADL/functional transfer/mobility tasks. Pt would benefit from continued skilled OT to increase safety and independence with completion of ADL/IADL tasks for functional independence and quality of life. Rehab Potential: good for established goals     Patient / Family Goal: return home      Patient and/or family were instructed on functional diagnosis, prognosis/goals and OT plan of care. Demonstrated good  understanding. Eval Complexity: Low    Time In: 1350  Time Out: 1406      Min Units   OT Eval Low 97165 x  1   OT Eval Medium 60664      OT Eval High 33033      OT Re-Eval [id number]       Therapeutic Ex 61363      Therapeutic Activities 72458       ADL/Self Care 00118       Orthotic Management 44733       Manual 19159     Neuro Re-Ed 80562       Non-Billable Time         Evaluation Time additionally includes thorough review of current medical information, gathering information on past medical history/social history and prior level of function, interpretation of standardized testing/informal observation of tasks, assessment of data and development of plan of care and goals.             April Vera  OTR/L  OT [id number]
no known allergies
done

## 2023-04-03 NOTE — ECT PRE-PROCEDURE CHECKLIST - TO WHOM
Pre ECT RN to Procedure Room RN
Pre ECT RN to Procedure Room RN
Treatment Room RN
Pre ECT RN to Procedure Room RN
Report  to procedure room RN 
Pre ECT RN to Procedure Room RN
Report  to procedure room RN

## 2023-04-03 NOTE — ECT PRE-PROCEDURE CHECKLIST - NSPROEDAREADYLEARN_GEN_A_NUR
depression/lack of motivation

## 2023-04-03 NOTE — ECT PRE-PROCEDURE CHECKLIST - NSECTNPODT_PSY_ALL_CORE
17-Mar-2023 00:00
22-Mar-2023 06:38
24-Mar-2023 00:00
27-Mar-2023 00:00
31-Mar-2023 00:00
02-Apr-2023 19:00
27-Mar-2023 00:00
29-Mar-2023 08:30
20-Mar-2023 00:00

## 2023-04-03 NOTE — BH INPATIENT PSYCHIATRY PROGRESS NOTE - NSBHMETABOLIC_PSY_ALL_CORE_FT
BMI: BMI (kg/m2): 27.1 (03-13-23 @ 12:49)  HbA1c: A1C with Estimated Average Glucose Result: 5.4 % (03-13-23 @ 08:00)    Glucose: POCT Blood Glucose.: 101 mg/dL (03-11-23 @ 17:05)    BP: 141/74 (04-03-23 @ 11:35) (140/73 - 164/78)  Lipid Panel: Date/Time: 03-13-23 @ 08:00  Cholesterol, Serum: 158  Direct LDL: --  HDL Cholesterol, Serum: 66  Total Cholesterol/HDL Ration Measurement: --  Triglycerides, Serum: 55

## 2023-04-03 NOTE — ECT PRE-PROCEDURE CHECKLIST - CAREGIVER ADDRESS
[address] [place] [address]
[address] [place] [address]
[address] [place] [address]
[address] [place] [address]
[address] [place] [address]
[address] [place] [address]
[address] [place] [address]
[address] [place] [address]
[address] [place] [address]

## 2023-04-03 NOTE — ECT TREATMENT NOTE - NSICDXBHPRIMARYDX_PSY_ALL_CORE
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  

## 2023-04-03 NOTE — ECT PRE-PROCEDURE CHECKLIST - NSECTCONSENT_PSY_ALL_CORE
ECT BF acute consent dated 3/15/23  Anesthesia Consent expires 6/20/23/yes
ECT BF acute 3/15/23  Anesthesia Consent expires 6/20/23/yes
BFA 3/15/23  Anesthesia expires 6/20/23/yes
BFA 3/15/23  Anesthesia expires 6/20/23/yes
ECT BF acute 3/15/23  Anesthesia Consent expires 6/20/23/yes
ECT BF acute 3/15/23  Anesthesia Consent expires 6/20/23/yes
ECT Consent  03/15/23-24  Anesthesia Consent 06/20/2023/yes

## 2023-04-03 NOTE — ECT PRE-PROCEDURE CHECKLIST - NSPROEDALEARNPREF_GEN_A_NUR
individual instruction/verbal instruction

## 2023-04-03 NOTE — ECT PRE-PROCEDURE CHECKLIST - NSPROPTRIGHTSUPPORTPHONE_GEN_A_NUR
[phone]
[phone]
[phone]
[phone]
[phone]
[phone]
[phone]
[phone]
[phone]

## 2023-04-03 NOTE — ECT PRE-PROCEDURE CHECKLIST - CAREGIVER NAME
Phu Briceno

## 2023-04-03 NOTE — BH INPATIENT PSYCHIATRY PROGRESS NOTE - NSBHCHARTREVIEWVS_PSY_A_CORE FT
Vital Signs Last 24 Hrs  T(C): 36.9 (04-03-23 @ 20:59), Max: 37.1 (04-03-23 @ 15:46)  T(F): 98.4 (04-03-23 @ 20:59), Max: 98.7 (04-03-23 @ 15:46)  HR: 71 (04-03-23 @ 11:35) (68 - 78)  BP: 141/74 (04-03-23 @ 11:35) (140/73 - 164/78)  BP(mean): --  RR: 16 (04-03-23 @ 12:03) (16 - 19)  SpO2: 97% (04-03-23 @ 20:59) (96% - 100%)    Orthostatic VS  04-03-23 @ 20:59  Lying BP: --/-- HR: --  Sitting BP: 131/78 HR: 79  Standing BP: 133/67 HR: 86  Site: --  Mode: --  Orthostatic VS  04-03-23 @ 12:03  Lying BP: --/-- HR: --  Sitting BP: 113/95 HR: 78  Standing BP: --/-- HR: --  Site: --  Mode: --  Orthostatic VS  04-03-23 @ 06:01  Lying BP: 132/62 HR: 99  Sitting BP: 133/66 HR: 75  Standing BP: --/-- HR: --  Site: --  Mode: --  Orthostatic VS  04-02-23 @ 20:56  Lying BP: --/-- HR: --  Sitting BP: 138/58 HR: 76  Standing BP: 143/78 HR: 68  Site: --  Mode: --  Orthostatic VS  04-02-23 @ 08:23  Lying BP: --/-- HR: --  Sitting BP: 141/56 HR: 77  Standing BP: 124/63 HR: 84  Site: --  Mode: --

## 2023-04-03 NOTE — ECT PRE-PROCEDURE CHECKLIST - NSPROPTRIGHTSUPPORTNAME_GEN_A_NUR
Thomas -  sister

## 2023-04-03 NOTE — BH INPATIENT PSYCHIATRY PROGRESS NOTE - NSBHASSESSSUMMFT_PSY_ALL_CORE
Patient is a 72 year old  retired  female currently residing in a private residence with  (daughter/son in law and 3 grandchildren live upstairs in a mother/daughter home). PPH MDD. Hx of 1 past inpatient admission 10-15 years ago (hospital unknown). Hx of 1 past suicide attempt 10-15 years ago via cutting- patient reports she required stitches. No history of aggression/violence, legal issues or substance use issues. PMH- Hypothyroidism. BIB  for depression. Admitted for depression, suicidal thoughts, self injurious behavior (cut self with scissors) in the context of poor treatment compliance and multiple psychosocial stressors (various ill family members). Also was noted to have delusions of jealousy. Started ECT (after starting abilify, lexapro) with good effects so far.     Plan:    MDD with melancholic features  Cognitive impairment vs. Pseudo dementia symptoms related to MDD  Delusional Disorder? - chronic thoughts  is cheating on her though he does have a h/o infidelity  Continue ECT 3x/week - #7 on Monday  Continue Lexapro 10mg po daily, abilify 10mg po daily  Dispo: d/c to home tomorrow if no return of significant confusion with outpatient ECT and GOPD with Dr. Cruz 4/10/23 -  updated today

## 2023-04-03 NOTE — ECT TREATMENT NOTE - NSECTABDMETACOMMENT_PSY_ALL_CORE
Hypothyroidism 

## 2023-04-03 NOTE — ECT PRE-PROCEDURE CHECKLIST - HAND OFF
Unit RN to OR RN
Unit RN to OR RN/Holding RN to OR RN
Unit RN to OR RN
Holding RN to OR RN
Unit RN to OR RN/Holding RN to OR RN

## 2023-04-03 NOTE — ECT PRE-PROCEDURE CHECKLIST - ALLERGIES
Allergies:-  No Known Allergies      

## 2023-04-03 NOTE — ECT TREATMENT NOTE - NSECTPOSTTXSUMMARY_PSY_ALL_CORE
The patient had a well modified grand mal seizure under general anesthesia and muscle relaxant. The patient is alert, responsive, in no acute distress.  Recovery uneventful.
The patient had a well modified grand mal seizure under general anesthesia and muscle relaxant.  The patient is alert, responsive, and in no acute distress. Recovery uneventful.
The patient had a well modified grand mal seizure under general anesthesia and muscle relaxant. The patient is alert, responsive, in no acute distress.  Recovery uneventful. 

## 2023-04-03 NOTE — ECT TREATMENT NOTE - NSECTTXPERFDATETIME_PSY_ALL_CORE
24-Mar-2023 10:47
27-Mar-2023 11:52
31-Mar-2023 11:11
22-Mar-2023 11:52
29-Mar-2023 17:06
20-Mar-2023 12:15
03-Apr-2023 10:50

## 2023-04-03 NOTE — ECT TREATMENT NOTE - NSECTCPTCODE_PSY_ALL_CORE
62307 (ECT-Electroconvulsive Therapy)
17559 (ECT-Electroconvulsive Therapy)
21825 (ECT-Electroconvulsive Therapy)
56318 (ECT-Electroconvulsive Therapy)
90992 (ECT-Electroconvulsive Therapy)
98547 (ECT-Electroconvulsive Therapy)
89058 (ECT-Electroconvulsive Therapy)

## 2023-04-03 NOTE — BH INPATIENT PSYCHIATRY PROGRESS NOTE - NSBHFUPINTERVALHXFT_PSY_A_CORE
Chart reviewed and case discussed with treatment team. Staff report patient has been calm and cooperative overnight. Patient was reportedly very confused and disorganized after last ECT on 3/31. Patient was less confused after today's ECT though she was clearly disoriented just after returning. Writer followed up later in the day and she was fully oriented. Denies depressed mood, denies SI, no delusions elicited. Had a 34 sec electrographic seizure.

## 2023-04-03 NOTE — ECT TREATMENT NOTE - NSECTREVSYS_PSY_ALL_CORE
Abdominal/Metabolic

## 2023-04-04 VITALS — TEMPERATURE: 97 F | RESPIRATION RATE: 18 BRPM

## 2023-04-04 PROCEDURE — 99231 SBSQ HOSP IP/OBS SF/LOW 25: CPT

## 2023-04-04 RX ORDER — ESCITALOPRAM OXALATE 10 MG/1
1 TABLET, FILM COATED ORAL
Qty: 14 | Refills: 0
Start: 2023-04-04 | End: 2023-04-17

## 2023-04-04 RX ORDER — ESCITALOPRAM OXALATE 10 MG/1
1 TABLET, FILM COATED ORAL
Qty: 14 | Refills: 0
Start: 2023-04-04

## 2023-04-04 RX ORDER — ARIPIPRAZOLE 15 MG/1
1 TABLET ORAL
Qty: 14 | Refills: 0
Start: 2023-04-04 | End: 2023-04-17

## 2023-04-04 RX ORDER — ARIPIPRAZOLE 15 MG/1
1 TABLET ORAL
Qty: 14 | Refills: 0
Start: 2023-04-04

## 2023-04-04 RX ADMIN — ESCITALOPRAM OXALATE 10 MILLIGRAM(S): 10 TABLET, FILM COATED ORAL at 08:11

## 2023-04-04 RX ADMIN — ARIPIPRAZOLE 10 MILLIGRAM(S): 15 TABLET ORAL at 08:12

## 2023-04-04 RX ADMIN — Medication 50 MICROGRAM(S): at 06:46

## 2023-04-04 NOTE — BH INPATIENT PSYCHIATRY PROGRESS NOTE - NSBHFUPINTERVALCCFT_PSY_A_CORE
"I don't want to go to ECT"
depression
depression
'I don't feel good'
improved mood
'I don't know what to do'
'I stopped my medication because I wasn't supposed to be on it long term'
'I am not good'
'I don't feel good'
"I don't want to go to ECT anymore"
"I feel better"
"I don't want to go to ECT anymore"
'I don't feel good'
"I feel much better"
depression
'I don't know what to do'
'I want to try ECT'
'I don't feel good'
"I don't know"
"I feel better"

## 2023-04-04 NOTE — BH INPATIENT PSYCHIATRY PROGRESS NOTE - NSTXDCOPNOINTERMD_PSY_ALL_CORE
Recommend appropriate outpatient follow up based on discharge condition. 
Recommend appropriate outpatient follow up based on discharge condition.

## 2023-04-04 NOTE — BH INPATIENT PSYCHIATRY PROGRESS NOTE - NSTXSUICIDPROGRES_PSY_ALL_CORE
No Change
No Change
Improving
No Change
No Change
Improving
No Change
No Change
Improving

## 2023-04-04 NOTE — BH INPATIENT PSYCHIATRY PROGRESS NOTE - NSBHASSESSSUMMFT_PSY_ALL_CORE
Patient is a 72 year old  retired  female currently residing in a private residence with  (daughter/son in law and 3 grandchildren live upstairs in a mother/daughter home). PPH MDD. Hx of 1 past inpatient admission 10-15 years ago (hospital unknown). Hx of 1 past suicide attempt 10-15 years ago via cutting- patient reports she required stitches. No history of aggression/violence, legal issues or substance use issues. PMH- Hypothyroidism. BIB  for depression. Admitted for depression, suicidal thoughts, self injurious behavior (cut self with scissors) in the context of poor treatment compliance and multiple psychosocial stressors (various ill family members). Also was noted to have delusions of jealousy though it was in regards to her 's infidelity which has in fact occurred in the past. Started ECT (after starting abilify, lexapro) with good effects.    Plan:  Dispo: discharge today as patient has had sustained improvement, suicidality having resolved, no mistrustfulness about her   MDD with melancholic features  Continue ECT 2x/week as an outpatient for the time being - can discuss with outpatient psychiatrist in the future regarding frequency  Continue Lexapro 10mg po daily, abilify 10mg po daily

## 2023-04-04 NOTE — BH INPATIENT PSYCHIATRY PROGRESS NOTE - CURRENT MEDICATION
MEDICATIONS  (STANDING):  ARIPiprazole 10 milliGRAM(s) Oral daily  escitalopram 10 milliGRAM(s) Oral daily  levothyroxine 50 MICROGram(s) Oral daily    MEDICATIONS  (PRN):  acetaminophen     Tablet .. 650 milliGRAM(s) Oral every 6 hours PRN Temp greater or equal to 38C (100.4F), Mild Pain (1 - 3), Moderate Pain (4 - 6)  aluminum hydroxide/magnesium hydroxide/simethicone Suspension 30 milliLiter(s) Oral every 6 hours PRN Dyspepsia  hydrOXYzine hydrochloride 25 milliGRAM(s) Oral every 8 hours PRN anxiety  ketotifen 0.025% Ophthalmic Solution 1 Drop(s) Both EYES two times a day PRN allergic conjunctivitis  
MEDICATIONS  (STANDING):  ARIPiprazole 10 milliGRAM(s) Oral daily  escitalopram 10 milliGRAM(s) Oral daily  levothyroxine 50 MICROGram(s) Oral daily    MEDICATIONS  (PRN):  acetaminophen     Tablet .. 650 milliGRAM(s) Oral every 6 hours PRN Temp greater or equal to 38C (100.4F), Mild Pain (1 - 3), Moderate Pain (4 - 6)  aluminum hydroxide/magnesium hydroxide/simethicone Suspension 30 milliLiter(s) Oral every 6 hours PRN Dyspepsia  hydrOXYzine hydrochloride 25 milliGRAM(s) Oral every 8 hours PRN anxiety  ketotifen 0.025% Ophthalmic Solution 1 Drop(s) Both EYES two times a day PRN allergic conjunctivitis  
MEDICATIONS  (STANDING):  ARIPiprazole 5 milliGRAM(s) Oral daily  escitalopram 5 milliGRAM(s) Oral daily  levothyroxine 50 MICROGram(s) Oral daily    MEDICATIONS  (PRN):  aluminum hydroxide/magnesium hydroxide/simethicone Suspension 30 milliLiter(s) Oral once PRN Dyspepsia  hydrOXYzine hydrochloride 25 milliGRAM(s) Oral every 8 hours PRN anxiety  ketotifen 0.025% Ophthalmic Solution 1 Drop(s) Both EYES two times a day PRN allergic conjunctivitis  LORazepam   Injectable 1 milliGRAM(s) IntraMuscular Once PRN extreme anxiety/agitation  
MEDICATIONS  (STANDING):  ARIPiprazole 10 milliGRAM(s) Oral daily  escitalopram 10 milliGRAM(s) Oral daily  levothyroxine 50 MICROGram(s) Oral daily    MEDICATIONS  (PRN):  acetaminophen     Tablet .. 650 milliGRAM(s) Oral every 6 hours PRN Temp greater or equal to 38C (100.4F), Mild Pain (1 - 3), Moderate Pain (4 - 6)  aluminum hydroxide/magnesium hydroxide/simethicone Suspension 30 milliLiter(s) Oral every 6 hours PRN Dyspepsia  hydrOXYzine hydrochloride 25 milliGRAM(s) Oral every 8 hours PRN anxiety  ketotifen 0.025% Ophthalmic Solution 1 Drop(s) Both EYES two times a day PRN allergic conjunctivitis  
MEDICATIONS  (STANDING):  levothyroxine 50 MICROGram(s) Oral daily  sertraline 25 milliGRAM(s) Oral daily    MEDICATIONS  (PRN):  ketotifen 0.025% Ophthalmic Solution 1 Drop(s) Both EYES two times a day PRN allergic conjunctivitis  LORazepam   Injectable 1 milliGRAM(s) IntraMuscular Once PRN extreme anxiety/agitation  
MEDICATIONS  (STANDING):  ARIPiprazole 10 milliGRAM(s) Oral daily  escitalopram 10 milliGRAM(s) Oral daily  levothyroxine 50 MICROGram(s) Oral daily    MEDICATIONS  (PRN):  acetaminophen     Tablet .. 650 milliGRAM(s) Oral every 6 hours PRN Temp greater or equal to 38C (100.4F), Mild Pain (1 - 3), Moderate Pain (4 - 6)  aluminum hydroxide/magnesium hydroxide/simethicone Suspension 30 milliLiter(s) Oral every 6 hours PRN Dyspepsia  hydrOXYzine hydrochloride 25 milliGRAM(s) Oral every 8 hours PRN anxiety  ketotifen 0.025% Ophthalmic Solution 1 Drop(s) Both EYES two times a day PRN allergic conjunctivitis  
MEDICATIONS  (STANDING):  ARIPiprazole 10 milliGRAM(s) Oral daily  escitalopram 10 milliGRAM(s) Oral daily  levothyroxine 50 MICROGram(s) Oral daily    MEDICATIONS  (PRN):  acetaminophen     Tablet .. 650 milliGRAM(s) Oral every 6 hours PRN Temp greater or equal to 38C (100.4F), Mild Pain (1 - 3), Moderate Pain (4 - 6)  aluminum hydroxide/magnesium hydroxide/simethicone Suspension 30 milliLiter(s) Oral every 6 hours PRN Dyspepsia  hydrOXYzine hydrochloride 25 milliGRAM(s) Oral every 8 hours PRN anxiety  ketotifen 0.025% Ophthalmic Solution 1 Drop(s) Both EYES two times a day PRN allergic conjunctivitis  
MEDICATIONS  (STANDING):  ARIPiprazole 7 milliGRAM(s) Oral daily  escitalopram 10 milliGRAM(s) Oral daily  levothyroxine 50 MICROGram(s) Oral daily    MEDICATIONS  (PRN):  aluminum hydroxide/magnesium hydroxide/simethicone Suspension 30 milliLiter(s) Oral every 6 hours PRN Dyspepsia  hydrOXYzine hydrochloride 25 milliGRAM(s) Oral every 8 hours PRN anxiety  ketotifen 0.025% Ophthalmic Solution 1 Drop(s) Both EYES two times a day PRN allergic conjunctivitis  
MEDICATIONS  (STANDING):  ARIPiprazole 10 milliGRAM(s) Oral daily  escitalopram 10 milliGRAM(s) Oral daily  levothyroxine 50 MICROGram(s) Oral daily    MEDICATIONS  (PRN):  acetaminophen     Tablet .. 650 milliGRAM(s) Oral every 6 hours PRN Temp greater or equal to 38C (100.4F), Mild Pain (1 - 3), Moderate Pain (4 - 6)  aluminum hydroxide/magnesium hydroxide/simethicone Suspension 30 milliLiter(s) Oral every 6 hours PRN Dyspepsia  hydrOXYzine hydrochloride 25 milliGRAM(s) Oral every 8 hours PRN anxiety  ketotifen 0.025% Ophthalmic Solution 1 Drop(s) Both EYES two times a day PRN allergic conjunctivitis  
MEDICATIONS  (STANDING):  ARIPiprazole 5 milliGRAM(s) Oral daily  escitalopram 5 milliGRAM(s) Oral daily  levothyroxine 50 MICROGram(s) Oral daily    MEDICATIONS  (PRN):  aluminum hydroxide/magnesium hydroxide/simethicone Suspension 30 milliLiter(s) Oral once PRN Dyspepsia  ketotifen 0.025% Ophthalmic Solution 1 Drop(s) Both EYES two times a day PRN allergic conjunctivitis  LORazepam   Injectable 1 milliGRAM(s) IntraMuscular Once PRN extreme anxiety/agitation  
MEDICATIONS  (STANDING):  ARIPiprazole 10 milliGRAM(s) Oral daily  escitalopram 10 milliGRAM(s) Oral daily  levothyroxine 50 MICROGram(s) Oral daily    MEDICATIONS  (PRN):  aluminum hydroxide/magnesium hydroxide/simethicone Suspension 30 milliLiter(s) Oral every 6 hours PRN Dyspepsia  hydrOXYzine hydrochloride 25 milliGRAM(s) Oral every 8 hours PRN anxiety  ketotifen 0.025% Ophthalmic Solution 1 Drop(s) Both EYES two times a day PRN allergic conjunctivitis  
MEDICATIONS  (STANDING):  ARIPiprazole 5 milliGRAM(s) Oral daily  escitalopram 5 milliGRAM(s) Oral daily  levothyroxine 50 MICROGram(s) Oral daily    MEDICATIONS  (PRN):  aluminum hydroxide/magnesium hydroxide/simethicone Suspension 30 milliLiter(s) Oral once PRN Dyspepsia  hydrOXYzine hydrochloride 25 milliGRAM(s) Oral every 8 hours PRN anxiety  ketotifen 0.025% Ophthalmic Solution 1 Drop(s) Both EYES two times a day PRN allergic conjunctivitis  LORazepam   Injectable 1 milliGRAM(s) IntraMuscular Once PRN extreme anxiety/agitation  
MEDICATIONS  (STANDING):  ARIPiprazole 5 milliGRAM(s) Oral daily  escitalopram 10 milliGRAM(s) Oral daily  levothyroxine 50 MICROGram(s) Oral daily    MEDICATIONS  (PRN):  aluminum hydroxide/magnesium hydroxide/simethicone Suspension 30 milliLiter(s) Oral every 6 hours PRN Dyspepsia  hydrOXYzine hydrochloride 25 milliGRAM(s) Oral every 8 hours PRN anxiety  ketotifen 0.025% Ophthalmic Solution 1 Drop(s) Both EYES two times a day PRN allergic conjunctivitis  
MEDICATIONS  (STANDING):  ARIPiprazole 5 milliGRAM(s) Oral daily  escitalopram 5 milliGRAM(s) Oral daily  levothyroxine 50 MICROGram(s) Oral daily    MEDICATIONS  (PRN):  aluminum hydroxide/magnesium hydroxide/simethicone Suspension 30 milliLiter(s) Oral once PRN Dyspepsia  aluminum hydroxide/magnesium hydroxide/simethicone Suspension 30 milliLiter(s) Oral every 6 hours PRN Dyspepsia  hydrOXYzine hydrochloride 25 milliGRAM(s) Oral every 8 hours PRN anxiety  ketotifen 0.025% Ophthalmic Solution 1 Drop(s) Both EYES two times a day PRN allergic conjunctivitis  
MEDICATIONS  (STANDING):  ARIPiprazole 5 milliGRAM(s) Oral daily  escitalopram 5 milliGRAM(s) Oral daily  levothyroxine 50 MICROGram(s) Oral daily    MEDICATIONS  (PRN):  aluminum hydroxide/magnesium hydroxide/simethicone Suspension 30 milliLiter(s) Oral once PRN Dyspepsia  aluminum hydroxide/magnesium hydroxide/simethicone Suspension 30 milliLiter(s) Oral every 6 hours PRN Dyspepsia  hydrOXYzine hydrochloride 25 milliGRAM(s) Oral every 8 hours PRN anxiety  ketotifen 0.025% Ophthalmic Solution 1 Drop(s) Both EYES two times a day PRN allergic conjunctivitis  
MEDICATIONS  (STANDING):  levothyroxine 50 MICROGram(s) Oral daily  sertraline 25 milliGRAM(s) Oral daily    MEDICATIONS  (PRN):  ketotifen 0.025% Ophthalmic Solution 1 Drop(s) Both EYES two times a day PRN allergic conjunctivitis  LORazepam   Injectable 1 milliGRAM(s) IntraMuscular Once PRN extreme anxiety/agitation  
MEDICATIONS  (STANDING):  ARIPiprazole 5 milliGRAM(s) Oral daily  escitalopram 10 milliGRAM(s) Oral daily  levothyroxine 50 MICROGram(s) Oral daily    MEDICATIONS  (PRN):  aluminum hydroxide/magnesium hydroxide/simethicone Suspension 30 milliLiter(s) Oral every 6 hours PRN Dyspepsia  aluminum hydroxide/magnesium hydroxide/simethicone Suspension 30 milliLiter(s) Oral once PRN Dyspepsia  hydrOXYzine hydrochloride 25 milliGRAM(s) Oral every 8 hours PRN anxiety  ketotifen 0.025% Ophthalmic Solution 1 Drop(s) Both EYES two times a day PRN allergic conjunctivitis  
MEDICATIONS  (STANDING):  ARIPiprazole 10 milliGRAM(s) Oral daily  escitalopram 10 milliGRAM(s) Oral daily  levothyroxine 50 MICROGram(s) Oral daily    MEDICATIONS  (PRN):  acetaminophen     Tablet .. 650 milliGRAM(s) Oral every 6 hours PRN Temp greater or equal to 38C (100.4F), Mild Pain (1 - 3), Moderate Pain (4 - 6)  aluminum hydroxide/magnesium hydroxide/simethicone Suspension 30 milliLiter(s) Oral every 6 hours PRN Dyspepsia  hydrOXYzine hydrochloride 25 milliGRAM(s) Oral every 8 hours PRN anxiety  ketotifen 0.025% Ophthalmic Solution 1 Drop(s) Both EYES two times a day PRN allergic conjunctivitis  
MEDICATIONS  (STANDING):  ARIPiprazole 5 milliGRAM(s) Oral daily  escitalopram 5 milliGRAM(s) Oral daily  levothyroxine 50 MICROGram(s) Oral daily    MEDICATIONS  (PRN):  aluminum hydroxide/magnesium hydroxide/simethicone Suspension 30 milliLiter(s) Oral once PRN Dyspepsia  hydrOXYzine hydrochloride 25 milliGRAM(s) Oral every 8 hours PRN anxiety  ketotifen 0.025% Ophthalmic Solution 1 Drop(s) Both EYES two times a day PRN allergic conjunctivitis  LORazepam   Injectable 1 milliGRAM(s) IntraMuscular Once PRN extreme anxiety/agitation  
MEDICATIONS  (STANDING):  ARIPiprazole 5 milliGRAM(s) Oral daily  escitalopram 5 milliGRAM(s) Oral daily  levothyroxine 50 MICROGram(s) Oral daily    MEDICATIONS  (PRN):  aluminum hydroxide/magnesium hydroxide/simethicone Suspension 30 milliLiter(s) Oral every 6 hours PRN Dyspepsia  aluminum hydroxide/magnesium hydroxide/simethicone Suspension 30 milliLiter(s) Oral once PRN Dyspepsia  hydrOXYzine hydrochloride 25 milliGRAM(s) Oral every 8 hours PRN anxiety  ketotifen 0.025% Ophthalmic Solution 1 Drop(s) Both EYES two times a day PRN allergic conjunctivitis

## 2023-04-04 NOTE — BH INPATIENT PSYCHIATRY PROGRESS NOTE - NSTXCOPEDATETRGT_PSY_ALL_CORE
18-Mar-2023
07-Apr-2023
07-Apr-2023
18-Mar-2023
31-Mar-2023
05-Apr-2023
07-Apr-2023
18-Mar-2023
31-Mar-2023
18-Mar-2023
05-Apr-2023
18-Mar-2023

## 2023-04-04 NOTE — BH INPATIENT PSYCHIATRY PROGRESS NOTE - NSBHFUPINTERVALHXFT_PSY_A_CORE
Chart reviewed and case discussed with treatment team. Staff report patient has been calm and cooperative overnight. No reports of disorganized behavior following last ECT session yesterday. Patient denies any depressed mood. No psychosis noted.

## 2023-04-04 NOTE — BH INPATIENT PSYCHIATRY PROGRESS NOTE - NSTXSUICIDINTERMD_PSY_ALL_CORE
Psychopharmacology and psychoeducation. Continue ECT.
Psychopharmacology and psychoeducation. Starting ECT.
Psychopharmacology and psychoeducation. Continue ECT.
Psychopharmacology and psychoeducation. Continue ECT.
Psychopharmacology and psychoeducation. Starting ECT.
Psychopharmacology and psychoeducation. Continue ECT.
Psychopharmacology and psychoeducation. Starting ECT.
Psychopharmacology and psychoeducation. Continue ECT.
Psychopharmacology and psychoeducation. Starting ECT.

## 2023-04-04 NOTE — BH INPATIENT PSYCHIATRY PROGRESS NOTE - NSTXSUICIDGOAL_PSY_ALL_CORE
Talk to staff and ask for assistance when having suicidal wishes instead of acting out
Will verbalize a decrease in preoccupation with suicidal thoughts and / or intent to commit suicide to 2 on a 10-point scale
Talk to staff and ask for assistance when having suicidal wishes instead of acting out
Be able to state 3 reasons for living
Be able to state 3 reasons for living
Will verbalize a decrease in preoccupation with suicidal thoughts and / or intent to commit suicide to 2 on a 10-point scale
Be able to state 3 reasons for living
Talk to staff and ask for assistance when having suicidal wishes instead of acting out
Will verbalize a decrease in preoccupation with suicidal thoughts and / or intent to commit suicide to 2 on a 10-point scale
Talk to staff and ask for assistance when having suicidal wishes instead of acting out
Will verbalize a decrease in preoccupation with suicidal thoughts and / or intent to commit suicide to 2 on a 10-point scale
Will verbalize a decrease in preoccupation with suicidal thoughts and / or intent to commit suicide to 2 on a 10-point scale
Talk to staff and ask for assistance when having suicidal wishes instead of acting out
Talk to staff and ask for assistance when having suicidal wishes instead of acting out

## 2023-04-04 NOTE — BH INPATIENT PSYCHIATRY PROGRESS NOTE - NSICDXBHPRIMARYDX_PSY_ALL_CORE
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  

## 2023-04-04 NOTE — BH INPATIENT PSYCHIATRY PROGRESS NOTE - NSTXDCOPNODATEEST_PSY_ALL_CORE
13-Mar-2023

## 2023-04-04 NOTE — BH INPATIENT PSYCHIATRY PROGRESS NOTE - NSCGIIMPROVESX_PSY_ALL_CORE
4 = No change - symptoms remain essentially unchanged
1 - Very much improved - nearly all better; good level of functioning; minimal symptoms; represents a very substantial change
4 = No change - symptoms remain essentially unchanged
2 = Much improved - notably better with signficant reduction of symptoms; increase in the level of functioning but some symptoms remain

## 2023-04-04 NOTE — BH INPATIENT PSYCHIATRY PROGRESS NOTE - MSE UNSTRUCTURED FT
Appearance: adequate grooming and hygiene; stable gait; no abnormal involuntary movements noted  Behavior: calm, cooperative, well engaged  Speech: wnl  Mood: denies depressed mood  Affect: neutral, stable, reactive  Thought process: linear, logical  Thought content: no delusions elicited; denies SI  Perceptual disturbances: no appearance of internal preoccupation  Orientation: oriented to all spheres  Abstraction: fair  Fund of knowledge: adequate  Insight: fair  Judgement: improving   
Appearance: intact grooming and hygiene; stable gait; no abnormal involuntary movements noted  Behavior: calm, cooperative, attentive  Speech: wnl  Mood: less depressed  Affect: brighter, stable, reactive  Thought process: linear, logical  Thought content: no delusions elicited; denies SI  Perceptual disturbances: no appearance of internal preoccupation  Orientation: oriented to month year; loosely oriented to date  Abstraction: fair  Fund of knowledge: adequate  Insight: fair  Judgement: improving   
Appearance: casually groomed, fair hygiene; stable gait; no abnormal involuntary movements noted  Behavior: calm, cooperative, well engaged, attentive  Speech: wnl  Mood: denies depressed mood  Affect: neutral, stable, reactive  Thought process: linear, logical  Thought content: no delusions elicited; denies SI  Perceptual disturbances: no appearance of internal preoccupation  Orientation: oriented to all spheres  Abstraction: fair  Fund of knowledge: adequate  Insight: fair  Judgement: intact  
Appearance: limited to basic grooming; fair hygiene; stable gait; no abnormal involuntary movements noted  Behavior: calm, cooperative  Speech: wnl  Mood: depressed  Affect: congruent, stable, reactive  Thought process: linear, logical  Thought content: no delusions elicited; denies SI  Perceptual disturbances: no appearance of internal preoccupation  Orientation: oriented to month year; loosely oriented to date  Abstraction: fair  Fund of knowledge: adequate  Insight: fair  Judgement: improving

## 2023-04-04 NOTE — BH INPATIENT PSYCHIATRY PROGRESS NOTE - NSTXSUICIDDATEEST_PSY_ALL_CORE
10-Mar-2023
14-Mar-2023
14-Mar-2023
10-Mar-2023
14-Mar-2023
10-Mar-2023
14-Mar-2023
14-Mar-2023
10-Mar-2023
14-Mar-2023
10-Mar-2023

## 2023-04-04 NOTE — BH INPATIENT PSYCHIATRY PROGRESS NOTE - NSTXDCOPNODATETRGT_PSY_ALL_CORE
20-Mar-2023
04-Apr-2023
20-Mar-2023
03-Apr-2023
04-Apr-2023
20-Mar-2023
27-Mar-2023
03-Apr-2023
27-Mar-2023
20-Mar-2023
27-Mar-2023
03-Apr-2023
27-Mar-2023
03-Apr-2023
27-Mar-2023
20-Mar-2023
03-Apr-2023
20-Mar-2023

## 2023-04-04 NOTE — BH INPATIENT PSYCHIATRY PROGRESS NOTE - NSCGISEVERILLNESS_PSY_ALL_CORE
6 = Severely ill - disruptive pathology, behavior and function are frequently influenced by symptoms, may require assistance from others
1 = Normal – not at all ill, symptoms of disorder not present past seven days
6 = Severely ill - disruptive pathology, behavior and function are frequently influenced by symptoms, may require assistance from others
4 = Moderately ill – overt symptoms causing noticeable, but modest, functional impairment or distress; symptom level may warrant medication

## 2023-04-04 NOTE — BH INPATIENT PSYCHIATRY PROGRESS NOTE - NSBHATTESTBILLING_PSY_A_CORE
53429-Skfjkfvqsh OBS or IP - moderate complexity OR 35-49 mins
05751-Uvzfnfxupn OBS or IP - moderate complexity OR 35-49 mins
[id number]-Eeemczyocz OBS or IP - moderate complexity OR 35-49 mins
69339-Vbbvmdndqw OBS or IP - moderate complexity OR 35-49 mins
46518-Bsqzatcuxy OBS or IP - high complexity OR 50-79 mins
67840-Wbezagcyge - moderate complexity OR 30-39 mins
22714-Igofnztibt OBS or IP - low complexity OR 25-34 mins
43021-Awhcmejeur OBS or IP - moderate complexity OR 35-49 mins
92120-Buffaucaiz OBS or IP - moderate complexity OR 35-49 mins
19411-Vjlfuliype OBS or IP - low complexity OR 25-34 mins
63905-Lleghgxbai OBS or IP - moderate complexity OR 35-49 mins
80453-Qerefvowid OBS or IP - moderate complexity OR 35-49 mins
95892-Ncgehljuyx OBS or IP - moderate complexity OR 35-49 mins
29594-Dibjmnpfbj OBS or IP - moderate complexity OR 35-49 mins
74243-Kgfgmqsdbd OBS or IP - high complexity OR 50-79 mins
91386-Ovaghhgxcc OBS or IP - moderate complexity OR 35-49 mins
42316-Brceatzvnm OBS or IP - moderate complexity OR 35-49 mins
49435-Cnegeygome OBS or IP - low complexity OR 25-34 mins
39712-Jwotwllfhw OBS or IP - moderate complexity OR 35-49 mins
34240-Juooqqktqm OBS or IP - moderate complexity OR 35-49 mins

## 2023-04-04 NOTE — BH INPATIENT PSYCHIATRY PROGRESS NOTE - NSBHCONSULTIPREASON_PSY_A_CORE
danger to self; mental illness expected to respond to inpatient care
other reason
danger to self; mental illness expected to respond to inpatient care

## 2023-04-04 NOTE — BH INPATIENT PSYCHIATRY PROGRESS NOTE - NSBHCHARTREVIEWVS_PSY_A_CORE FT
Vital Signs Last 24 Hrs  T(C): 35.9 (04-04-23 @ 05:21), Max: 37.1 (04-03-23 @ 15:46)  T(F): 96.6 (04-04-23 @ 05:21), Max: 98.7 (04-03-23 @ 15:46)  HR: --  BP: --  BP(mean): --  RR: 18 (04-04-23 @ 05:21) (18 - 18)  SpO2: 97% (04-03-23 @ 20:59) (96% - 97%)    Orthostatic VS  04-04-23 @ 05:21  Lying BP: 125/58 HR: 68  Sitting BP: 133/57 HR: 71  Standing BP: --/-- HR: --  Site: upper left arm  Mode: electronic  Orthostatic VS  04-03-23 @ 20:59  Lying BP: --/-- HR: --  Sitting BP: 131/78 HR: 79  Standing BP: 133/67 HR: 86  Site: --  Mode: --  Orthostatic VS  04-03-23 @ 12:03  Lying BP: --/-- HR: --  Sitting BP: 113/95 HR: 78  Standing BP: --/-- HR: --  Site: --  Mode: --  Orthostatic VS  04-03-23 @ 06:01  Lying BP: 132/62 HR: 99  Sitting BP: 133/66 HR: 75  Standing BP: --/-- HR: --  Site: --  Mode: --  Orthostatic VS  04-02-23 @ 20:56  Lying BP: --/-- HR: --  Sitting BP: 138/58 HR: 76  Standing BP: 143/78 HR: 68  Site: --  Mode: --

## 2023-04-04 NOTE — BH INPATIENT PSYCHIATRY PROGRESS NOTE - NSBHFUPMEDSE_PSY_A_CORE
None known
Yes
Yes
None known

## 2023-04-04 NOTE — BH INPATIENT PSYCHIATRY PROGRESS NOTE - NSTXDCOPNOPROGRES_PSY_ALL_CORE
Improving

## 2023-04-04 NOTE — BH INPATIENT PSYCHIATRY PROGRESS NOTE - PRN MEDS
MEDICATIONS  (PRN):  acetaminophen     Tablet .. 650 milliGRAM(s) Oral every 6 hours PRN Temp greater or equal to 38C (100.4F), Mild Pain (1 - 3), Moderate Pain (4 - 6)  aluminum hydroxide/magnesium hydroxide/simethicone Suspension 30 milliLiter(s) Oral every 6 hours PRN Dyspepsia  hydrOXYzine hydrochloride 25 milliGRAM(s) Oral every 8 hours PRN anxiety  ketotifen 0.025% Ophthalmic Solution 1 Drop(s) Both EYES two times a day PRN allergic conjunctivitis  
MEDICATIONS  (PRN):  aluminum hydroxide/magnesium hydroxide/simethicone Suspension 30 milliLiter(s) Oral every 6 hours PRN Dyspepsia  hydrOXYzine hydrochloride 25 milliGRAM(s) Oral every 8 hours PRN anxiety  ketotifen 0.025% Ophthalmic Solution 1 Drop(s) Both EYES two times a day PRN allergic conjunctivitis  
MEDICATIONS  (PRN):  aluminum hydroxide/magnesium hydroxide/simethicone Suspension 30 milliLiter(s) Oral once PRN Dyspepsia  hydrOXYzine hydrochloride 25 milliGRAM(s) Oral every 8 hours PRN anxiety  ketotifen 0.025% Ophthalmic Solution 1 Drop(s) Both EYES two times a day PRN allergic conjunctivitis  LORazepam   Injectable 1 milliGRAM(s) IntraMuscular Once PRN extreme anxiety/agitation  
MEDICATIONS  (PRN):  aluminum hydroxide/magnesium hydroxide/simethicone Suspension 30 milliLiter(s) Oral once PRN Dyspepsia  hydrOXYzine hydrochloride 25 milliGRAM(s) Oral every 8 hours PRN anxiety  ketotifen 0.025% Ophthalmic Solution 1 Drop(s) Both EYES two times a day PRN allergic conjunctivitis  LORazepam   Injectable 1 milliGRAM(s) IntraMuscular Once PRN extreme anxiety/agitation  
MEDICATIONS  (PRN):  acetaminophen     Tablet .. 650 milliGRAM(s) Oral every 6 hours PRN Temp greater or equal to 38C (100.4F), Mild Pain (1 - 3), Moderate Pain (4 - 6)  aluminum hydroxide/magnesium hydroxide/simethicone Suspension 30 milliLiter(s) Oral every 6 hours PRN Dyspepsia  hydrOXYzine hydrochloride 25 milliGRAM(s) Oral every 8 hours PRN anxiety  ketotifen 0.025% Ophthalmic Solution 1 Drop(s) Both EYES two times a day PRN allergic conjunctivitis  
MEDICATIONS  (PRN):  aluminum hydroxide/magnesium hydroxide/simethicone Suspension 30 milliLiter(s) Oral once PRN Dyspepsia  aluminum hydroxide/magnesium hydroxide/simethicone Suspension 30 milliLiter(s) Oral every 6 hours PRN Dyspepsia  hydrOXYzine hydrochloride 25 milliGRAM(s) Oral every 8 hours PRN anxiety  ketotifen 0.025% Ophthalmic Solution 1 Drop(s) Both EYES two times a day PRN allergic conjunctivitis  
MEDICATIONS  (PRN):  acetaminophen     Tablet .. 650 milliGRAM(s) Oral every 6 hours PRN Temp greater or equal to 38C (100.4F), Mild Pain (1 - 3), Moderate Pain (4 - 6)  aluminum hydroxide/magnesium hydroxide/simethicone Suspension 30 milliLiter(s) Oral every 6 hours PRN Dyspepsia  hydrOXYzine hydrochloride 25 milliGRAM(s) Oral every 8 hours PRN anxiety  ketotifen 0.025% Ophthalmic Solution 1 Drop(s) Both EYES two times a day PRN allergic conjunctivitis  
MEDICATIONS  (PRN):  aluminum hydroxide/magnesium hydroxide/simethicone Suspension 30 milliLiter(s) Oral once PRN Dyspepsia  ketotifen 0.025% Ophthalmic Solution 1 Drop(s) Both EYES two times a day PRN allergic conjunctivitis  LORazepam   Injectable 1 milliGRAM(s) IntraMuscular Once PRN extreme anxiety/agitation  
MEDICATIONS  (PRN):  ketotifen 0.025% Ophthalmic Solution 1 Drop(s) Both EYES two times a day PRN allergic conjunctivitis  LORazepam   Injectable 1 milliGRAM(s) IntraMuscular Once PRN extreme anxiety/agitation  
MEDICATIONS  (PRN):  acetaminophen     Tablet .. 650 milliGRAM(s) Oral every 6 hours PRN Temp greater or equal to 38C (100.4F), Mild Pain (1 - 3), Moderate Pain (4 - 6)  aluminum hydroxide/magnesium hydroxide/simethicone Suspension 30 milliLiter(s) Oral every 6 hours PRN Dyspepsia  hydrOXYzine hydrochloride 25 milliGRAM(s) Oral every 8 hours PRN anxiety  ketotifen 0.025% Ophthalmic Solution 1 Drop(s) Both EYES two times a day PRN allergic conjunctivitis  
MEDICATIONS  (PRN):  aluminum hydroxide/magnesium hydroxide/simethicone Suspension 30 milliLiter(s) Oral every 6 hours PRN Dyspepsia  hydrOXYzine hydrochloride 25 milliGRAM(s) Oral every 8 hours PRN anxiety  ketotifen 0.025% Ophthalmic Solution 1 Drop(s) Both EYES two times a day PRN allergic conjunctivitis  
MEDICATIONS  (PRN):  aluminum hydroxide/magnesium hydroxide/simethicone Suspension 30 milliLiter(s) Oral every 6 hours PRN Dyspepsia  aluminum hydroxide/magnesium hydroxide/simethicone Suspension 30 milliLiter(s) Oral once PRN Dyspepsia  hydrOXYzine hydrochloride 25 milliGRAM(s) Oral every 8 hours PRN anxiety  ketotifen 0.025% Ophthalmic Solution 1 Drop(s) Both EYES two times a day PRN allergic conjunctivitis  
MEDICATIONS  (PRN):  aluminum hydroxide/magnesium hydroxide/simethicone Suspension 30 milliLiter(s) Oral every 6 hours PRN Dyspepsia  hydrOXYzine hydrochloride 25 milliGRAM(s) Oral every 8 hours PRN anxiety  ketotifen 0.025% Ophthalmic Solution 1 Drop(s) Both EYES two times a day PRN allergic conjunctivitis  
MEDICATIONS  (PRN):  ketotifen 0.025% Ophthalmic Solution 1 Drop(s) Both EYES two times a day PRN allergic conjunctivitis  LORazepam   Injectable 1 milliGRAM(s) IntraMuscular Once PRN extreme anxiety/agitation  
MEDICATIONS  (PRN):  aluminum hydroxide/magnesium hydroxide/simethicone Suspension 30 milliLiter(s) Oral once PRN Dyspepsia  hydrOXYzine hydrochloride 25 milliGRAM(s) Oral every 8 hours PRN anxiety  ketotifen 0.025% Ophthalmic Solution 1 Drop(s) Both EYES two times a day PRN allergic conjunctivitis  LORazepam   Injectable 1 milliGRAM(s) IntraMuscular Once PRN extreme anxiety/agitation  
MEDICATIONS  (PRN):  aluminum hydroxide/magnesium hydroxide/simethicone Suspension 30 milliLiter(s) Oral every 6 hours PRN Dyspepsia  aluminum hydroxide/magnesium hydroxide/simethicone Suspension 30 milliLiter(s) Oral once PRN Dyspepsia  hydrOXYzine hydrochloride 25 milliGRAM(s) Oral every 8 hours PRN anxiety  ketotifen 0.025% Ophthalmic Solution 1 Drop(s) Both EYES two times a day PRN allergic conjunctivitis  
MEDICATIONS  (PRN):  aluminum hydroxide/magnesium hydroxide/simethicone Suspension 30 milliLiter(s) Oral once PRN Dyspepsia  aluminum hydroxide/magnesium hydroxide/simethicone Suspension 30 milliLiter(s) Oral every 6 hours PRN Dyspepsia  hydrOXYzine hydrochloride 25 milliGRAM(s) Oral every 8 hours PRN anxiety  ketotifen 0.025% Ophthalmic Solution 1 Drop(s) Both EYES two times a day PRN allergic conjunctivitis  
MEDICATIONS  (PRN):  acetaminophen     Tablet .. 650 milliGRAM(s) Oral every 6 hours PRN Temp greater or equal to 38C (100.4F), Mild Pain (1 - 3), Moderate Pain (4 - 6)  aluminum hydroxide/magnesium hydroxide/simethicone Suspension 30 milliLiter(s) Oral every 6 hours PRN Dyspepsia  hydrOXYzine hydrochloride 25 milliGRAM(s) Oral every 8 hours PRN anxiety  ketotifen 0.025% Ophthalmic Solution 1 Drop(s) Both EYES two times a day PRN allergic conjunctivitis

## 2023-04-04 NOTE — BH INPATIENT PSYCHIATRY PROGRESS NOTE - NSTXSUICIDDATETRGT_PSY_ALL_CORE
22-Mar-2023
30-Mar-2023
07-Apr-2023
22-Mar-2023
22-Mar-2023
07-Apr-2023
22-Mar-2023
05-Apr-2023
30-Mar-2023
05-Apr-2023
05-Apr-2023
22-Mar-2023
30-Mar-2023

## 2023-04-04 NOTE — BH INPATIENT PSYCHIATRY PROGRESS NOTE - NSDCCRITERIA_PSY_ALL_CORE
cgi less than 3

## 2023-04-04 NOTE — BH INPATIENT PSYCHIATRY PROGRESS NOTE - NSTXCOPEDATEEST_PSY_ALL_CORE
11-Mar-2023
10-Mar-2023
11-Mar-2023
10-Mar-2023
11-Mar-2023
11-Mar-2023

## 2023-04-05 ENCOUNTER — OUTPATIENT (OUTPATIENT)
Dept: OUTPATIENT SERVICES | Facility: HOSPITAL | Age: 73
LOS: 1 days | Discharge: HOME | End: 2023-04-05

## 2023-04-05 NOTE — SOCIAL WORK POST DISCHARGE FOLLOW UP NOTE - NSBHSWFOLLOWUP_PSY_ALL_CORE_FT
nursing advised writer that the daughter who resides with pt called the unit with a question about rx. which was resolved. and a statement that pt does not have appointment for psychiatrist.   sw called pt, sw explained the appointments are on page 1 of 5 (as daughter has the paperwork- according to daughter) sw also advised of appointments at [facility]. michael explained if pt wants verbal therapy at [facility] to advise Dr Cruz at intake to address assignment.     sw asked if pt wanted sw to call daughter. pt declined.

## 2023-04-06 NOTE — SOCIAL WORK POST DISCHARGE FOLLOW UP NOTE - NSBHSWFOLLOWUP_PSY_ALL_CORE_FT
message from daughter Lenore    . message stated there is no psychiatric med management appointment.  sw called back. left message - advised no release in place to speak to her, however pt does have an appointment and it can be found on page 1 of 5 of the Patient discharge instructions.     sw then called other daughter, as there is a release- Yoselyn   sw advised of above  message from her sister and writer message- and inability to communicate with Lenore.    Yoselyn noted only limited RX provided and no psy follow up appointment to refill.   Michael advised that dc info on page 1 of 5  and then read the "Follow Up and Appointments" as documented on page 1 -  (for purposes of this note- in short  pt has gopd and ect appointments in place and advised of all the details)  . michael noted ect was today and yoselyn indicated that they are aware.   Yoselyn thanked writer for info and will follow up with family.     upon ending call with Yoselyn, - there was a message from Lenore that she missed sw call back -  michael did not call Lenore as per prior call to Yoselyn and no release in place .

## 2023-04-10 ENCOUNTER — OUTPATIENT (OUTPATIENT)
Dept: OUTPATIENT SERVICES | Facility: HOSPITAL | Age: 73
LOS: 1 days | Discharge: HOME | End: 2023-04-10
Payer: MEDICARE

## 2023-04-10 PROCEDURE — 90792 PSYCH DIAG EVAL W/MED SRVCS: CPT

## 2023-04-17 DIAGNOSIS — F33.3 MAJOR DEPRESSIVE DISORDER, RECURRENT, SEVERE WITH PSYCHOTIC SYMPTOMS: ICD-10-CM

## 2023-04-24 PROCEDURE — 99214 OFFICE O/P EST MOD 30 MIN: CPT

## 2023-05-25 PROCEDURE — 99214 OFFICE O/P EST MOD 30 MIN: CPT

## 2023-05-25 PROCEDURE — 90836 PSYTX W PT W E/M 45 MIN: CPT

## 2023-07-07 PROCEDURE — 90833 PSYTX W PT W E/M 30 MIN: CPT

## 2023-07-07 PROCEDURE — 99213 OFFICE O/P EST LOW 20 MIN: CPT

## 2023-07-28 PROCEDURE — 99214 OFFICE O/P EST MOD 30 MIN: CPT

## 2023-07-28 PROCEDURE — 90833 PSYTX W PT W E/M 30 MIN: CPT

## 2024-03-05 NOTE — BH INPATIENT PSYCHIATRY PROGRESS NOTE - MSE OPTIONS
Detail Level: Detailed Quality 226: Preventive Care And Screening: Tobacco Use: Screening And Cessation Intervention: Patient screened for tobacco use and is an ex/non-smoker Quality 431: Preventive Care And Screening: Unhealthy Alcohol Use - Screening: Patient not identified as an unhealthy alcohol user when screened for unhealthy alcohol use using a systematic screening method Unstructured MSE

## 2024-03-29 NOTE — ED BEHAVIORAL HEALTH ASSESSMENT NOTE - NS ED BHA PLAN ADMIT TO PSYCHIATRY ADMIT TO
Problem: Safety - Adult  Goal: Free from fall injury  Reactivated     
[facility]

## 2024-12-02 NOTE — ED PROVIDER NOTE - CPE EDP RESP NORM
Consent: The patient's consent was obtained including but not limited to risks of crusting, scabbing, blistering, scarring, darker or lighter pigmentary change, recurrence, incomplete removal and infection.
Duration Of Freeze Thaw-Cycle (Seconds): 0
Show Aperture Variable?: Yes
Render Post-Care Instructions In Note?: no
Post-Care Instructions: I reviewed with the patient in detail post-care instructions. Patient is to wear sunprotection, and avoid picking at any of the treated lesions. Pt may apply Vaseline to crusted or scabbing areas.
Detail Level: Detailed
normal...

## 2025-04-07 ENCOUNTER — APPOINTMENT (OUTPATIENT)
Dept: PULMONOLOGY | Facility: CLINIC | Age: 75
End: 2025-04-07
Payer: MEDICARE

## 2025-04-07 VITALS
HEIGHT: 66 IN | SYSTOLIC BLOOD PRESSURE: 158 MMHG | BODY MASS INDEX: 25.71 KG/M2 | WEIGHT: 160 LBS | OXYGEN SATURATION: 94 % | TEMPERATURE: 97.8 F | DIASTOLIC BLOOD PRESSURE: 86 MMHG | HEART RATE: 79 BPM

## 2025-04-07 DIAGNOSIS — J44.9 CHRONIC OBSTRUCTIVE PULMONARY DISEASE, UNSPECIFIED: ICD-10-CM

## 2025-04-07 DIAGNOSIS — Z87.891 PERSONAL HISTORY OF NICOTINE DEPENDENCE: ICD-10-CM

## 2025-04-07 DIAGNOSIS — R06.09 OTHER FORMS OF DYSPNEA: ICD-10-CM

## 2025-04-07 PROCEDURE — 94729 DIFFUSING CAPACITY: CPT

## 2025-04-07 PROCEDURE — 95012 NITRIC OXIDE EXP GAS DETER: CPT

## 2025-04-07 PROCEDURE — 94010 BREATHING CAPACITY TEST: CPT

## 2025-04-07 PROCEDURE — ZZZZZ: CPT

## 2025-04-07 PROCEDURE — 94727 GAS DIL/WSHOT DETER LNG VOL: CPT

## 2025-04-07 PROCEDURE — 99204 OFFICE O/P NEW MOD 45 MIN: CPT | Mod: 25

## 2025-04-07 RX ORDER — FLUTICASONE FUROATE, UMECLIDINIUM BROMIDE AND VILANTEROL TRIFENATATE 100; 62.5; 25 UG/1; UG/1; UG/1
100-62.5-25 POWDER RESPIRATORY (INHALATION)
Qty: 3 | Refills: 3 | Status: ACTIVE | COMMUNITY
Start: 2025-04-07 | End: 1900-01-01

## 2025-04-08 LAB
ANION GAP SERPL CALC-SCNC: 9 MMOL/L
BUN SERPL-MCNC: 8 MG/DL
CALCIUM SERPL-MCNC: 9.3 MG/DL
CHLORIDE SERPL-SCNC: 103 MMOL/L
CO2 SERPL-SCNC: 29 MMOL/L
CREAT SERPL-MCNC: 0.56 MG/DL
DEPRECATED D DIMER PPP IA-ACNC: <150 NG/ML DDU
EGFRCR SERPLBLD CKD-EPI 2021: 95 ML/MIN/1.73M2
GLUCOSE SERPL-MCNC: 92 MG/DL
HEMOGLOBIN: 11.9
POTASSIUM SERPL-SCNC: 4.3 MMOL/L
SODIUM SERPL-SCNC: 141 MMOL/L

## 2025-05-12 ENCOUNTER — APPOINTMENT (OUTPATIENT)
Dept: PULMONOLOGY | Facility: CLINIC | Age: 75
End: 2025-05-12